# Patient Record
Sex: FEMALE | Race: WHITE | NOT HISPANIC OR LATINO | Employment: STUDENT | URBAN - METROPOLITAN AREA
[De-identification: names, ages, dates, MRNs, and addresses within clinical notes are randomized per-mention and may not be internally consistent; named-entity substitution may affect disease eponyms.]

---
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
## 2023-01-03 DIAGNOSIS — F32.A DEPRESSION, UNSPECIFIED DEPRESSION TYPE: ICD-10-CM

## 2023-02-07 ENCOUNTER — TELEPHONE (OUTPATIENT)
Dept: PSYCHIATRY | Facility: CLINIC | Age: 18
End: 2023-02-07

## 2023-02-07 NOTE — LETTER
23    Pia Luna  : 2005  [address] [place] [address] [address]        Dear Pia Luna and/or Parent/Guardian: We are writing to inform you that your last completed medication management appointment with Rosemary Smiley MD was on 2022  Your health and follow-up care are important to us  We want to make you aware that you do not have another appointment with Rosemary Smiley MD scheduled  Please call our office at [phone] as soon as possible so we can schedule your appointment and prevent an interruption of your care  If you have already scheduled a follow-up appointment, please disregard  If we do not hear from you within 10 business days to make a follow up appointment, we will assume you are no longer interested in care here, as it has been over a year since you were last seen in our office      Sincerely,      [address] [facility] [address]

## 2023-02-10 PROBLEM — R50.9 FEVER: Status: RESOLVED | Noted: 2021-12-27 | Resolved: 2023-02-10

## 2023-02-20 ENCOUNTER — OFFICE VISIT (OUTPATIENT)
Dept: OBGYN CLINIC | Facility: CLINIC | Age: 18
End: 2023-02-20

## 2023-02-20 VITALS
HEART RATE: 90 BPM | BODY MASS INDEX: 26.43 KG/M2 | SYSTOLIC BLOOD PRESSURE: 120 MMHG | DIASTOLIC BLOOD PRESSURE: 76 MMHG | WEIGHT: 140 LBS | HEIGHT: 61 IN

## 2023-02-20 DIAGNOSIS — M22.2X2 PATELLOFEMORAL DISORDER OF LEFT KNEE: ICD-10-CM

## 2023-02-20 DIAGNOSIS — S89.92XA INJURY OF LEFT KNEE, INITIAL ENCOUNTER: ICD-10-CM

## 2023-02-20 DIAGNOSIS — M25.562 ACUTE PAIN OF LEFT KNEE: Primary | ICD-10-CM

## 2023-02-20 NOTE — PROGRESS NOTES
Assessment/Plan:    Diagnoses and all orders for this visit:    Acute pain of left knee  -     XR knee 4+ vw left injury; Future  -     Durable Medical Equipment  -     Ambulatory Referral to Physical Therapy; Future    Injury of left knee, initial encounter  -     XR knee 4+ vw left injury; Future  -     Durable Medical Equipment  -     Ambulatory Referral to Physical Therapy; Future    Patellofemoral disorder of left knee  -     XR knee 4+ vw left injury; Future  -     Durable Medical Equipment  -     Ambulatory Referral to Physical Therapy; Future    Other orders  -     tretinoin (RETIN-A) 0 025 % cream; APPLY EVERY NIGHT AT BEDTIME      Return in about 6 weeks (around 4/3/2023)  Subjective:   Patient ID: Lalit Leblanc is a 21 y o  female  Patient new to me presents for LEFT knee pain occurring about 1 month ago after standing up after squatting, felt immediate pain of the anterior knee no pop with subsequent swelling  This has occurred several times since  She notes difficulty walking and with stairs  Denies symptoms of looseness or instability but will experience popping  Review of Systems    The following portions of the patient's chart were reviewed and updated as appropriate:    Allergy:    Allergies   Allergen Reactions   • Kiwi Extract - Food Allergy Shortness Of Breath   • [place] Flavor - Food Allergy Itching       Medications:    Current Outpatient Medications:   •  albuterol (PROVENTIL HFA,VENTOLIN HFA) 90 mcg/act inhaler, TAKE 2 PUFFS BY MOUTH EVERY 6 HOURS AS NEEDED FOR WHEEZE, Disp: 8 5 Inhaler, Rfl: 4  •  ergocalciferol (VITAMIN D2) 50,000 units, Take 1 capsule (50,000 Units total) by mouth once a week, Disp: 12 capsule, Rfl: 1  •  famotidine (PEPCID) 20 mg tablet, TAKE 1 TABLET BY MOUTH TWICE A DAY FOR 10 DAYS, Disp: 180 tablet, Rfl: 0  •  fluticasone (FLONASE) 50 mcg/act nasal spray, SPRAY 1 SPRAY INTO EACH NOSTRIL EVERY DAY, Disp: 16 mL, Rfl: 1  •  Glycopyrronium Tosylate 2 4 % PADS, Apply topically, Disp: , Rfl:   •  loratadine (CLARITIN) 10 mg tablet, TAKE 1 TABLET BY MOUTH EVERY DAY, Disp: 30 tablet, Rfl: 0  •  medroxyPROGESTERone acetate (DEPO-PROVERA SYRINGE) 150 mg/mL injection, INJECT 150 MG INTRAMUSCULAR EVERY 12 WEEKS, Disp: , Rfl: 0  •  nystatin (MYCOSTATIN) cream, Apply 1 application topically 2 (two) times a day To affected area, Disp: , Rfl:   •  sertraline (ZOLOFT) 50 mg tablet, TAKE 1 TABLET BY MOUTH EVERY DAY, Disp: 90 tablet, Rfl: 0  •  tretinoin (RETIN-A) 0 025 % cream, APPLY EVERY NIGHT AT BEDTIME, Disp: , Rfl:   •  benzonatate (TESSALON) 200 MG capsule, Take 1 capsule (200 mg total) by mouth 3 (three) times a day as needed for cough (Patient not taking: Reported on 2/25/2022), Disp: 20 capsule, Rfl: 0  •  brompheniramine-pseudoephedrine-DM 30-2-10 MG/5ML syrup, , Disp: , Rfl:   •  meloxicam (MOBIC) 15 mg tablet, TAKE 1 TABLET (15 MG TOTAL) BY MOUTH DAILY  (Patient not taking: Reported on 6/14/2022), Disp: 30 tablet, Rfl: 0  •  prednisoLONE (PRELONE) 15 MG/5ML syrup, Take 10 ml daily for 5 days   (Patient not taking: Reported on 7/20/2022), Disp: 50 mL, Rfl: 0    Patient Active Problem List   Diagnosis   • Attention deficit hyperactivity disorder (ADHD), combined type   • REUBEN (generalized anxiety disorder)   • Major depressive disorder, single episode, moderate degree (HCC)   • Vitamin D deficiency   • Exercise counseling   • Nutritional counseling   • Left lower quadrant pain   • Generalized abdominal pain   • Heart murmur   • Excessive and frequent menstruation with irregular cycle   • Dysmenorrhea, unspecified   • Primary insomnia   • Alopecia areata   • Attention deficit hyperactivity disorder, inattentive type   • Depression   • Costochondritis   • Encounter for routine child health examination with abnormal findings   • Encounter for immunization   • Asthma   • Suspected COVID-19 virus infection   • Chest tightness   • Right foot pain   • Hordeolum externum of left upper eyelid   • Weight gain   • Vitamin D insufficiency   • Influenza vaccine administered   • Gastroesophageal reflux disease without esophagitis   • Viral infection, unspecified   • Acute pain of right knee   • Sorethroat   • Epigastric pain   • COVID-19 virus infection   • Other fatigue   • Absolute anemia   • Injury of left knee   • Patellofemoral disorder of left knee   • Acute pain of left knee       Objective:  /76   Pulse 90   Ht 5' 1" (1 549 m)   Wt 63 5 kg (140 lb)   BMI 26 45 kg/m²     Right Knee Exam     Tests   Lachman:  Anterior - negative    Posterior - negative      Left Knee Exam     Tenderness   The patient is experiencing tenderness in the patellar tendon and patella  Range of Motion   The patient has normal left knee ROM  Extension: normal     Tests   Solange:  Medial - negative Lateral - negative  Varus: negative Valgus: negative  Lachman:  Anterior - negative    Posterior - negative    Other   Erythema: absent  Sensation: normal  Swelling: none  Effusion: no effusion present          Observations   Left Knee   Negative for effusion  Physical Exam  Musculoskeletal:      Left knee: No effusion  Instability Tests: Medial Solange test negative and lateral Solange test negative             Neurologic Exam    Procedures    I have personally reviewed pertinent films in PACS and my interpretation is Xrays Left Knee:             Past Medical History:   Diagnosis Date   • Acne    • ADHD    • Anxiety    • Asthma    • Concussion     X 2 IN 2017   • Depression    • Fracture of lumbar spine (HCC)    • GERD (gastroesophageal reflux disease)    • Hydronephrosis    • Pertussis    • Stress fracture    • Vesicoureteral reflux        Past Surgical History:   Procedure Laterality Date   • CYST REMOVAL      Left forearm   • WISDOM TOOTH EXTRACTION         Social History     Socioeconomic History   • Marital status: Single     Spouse name: Not on file   • Number of children: Not on file   • Years of education: Not on file   • Highest education level: Not on file   Occupational History   • Occupation: STUDENT   Tobacco Use   • Smoking status: Never   • Smokeless tobacco: Former   Vaping Use   • Vaping Use: Former   Substance and Sexual Activity   • Alcohol use: Never   • Drug use: Never   • Sexual activity: Not on file   Other Topics Concern   • Not on file   Social History Narrative   • Not on file     Social Determinants of Health     Financial Resource Strain: Not on file   Food Insecurity: Not on file   Transportation Needs: Not on file   Physical Activity: Not on file   Stress: Not on file   Social Connections: Not on file   Intimate Partner Violence: Not on file   Housing Stability: Not on file       Family History   Problem Relation Age of Onset   • Hypertension Mother    • Heart murmur Mother    • Depression Mother    • Anxiety disorder Mother    • Celiac disease Father    • ADD / ADHD Father    • Cancer Paternal Grandmother         adrenal gland   • Celiac disease Paternal Grandfather    • Anxiety disorder Sister    • Depression Sister    • ADD / ADHD Sister

## 2023-03-14 ENCOUNTER — TELEPHONE (OUTPATIENT)
Dept: OBGYN CLINIC | Facility: HOSPITAL | Age: 18
End: 2023-03-14

## 2023-03-14 NOTE — TELEPHONE ENCOUNTER
Caller: Patient    Doctor: Lauren Mccarthy    Reason for call: Patient calling back looking for an update in regards to her knee pain      Call back#: [phone]

## 2023-03-14 NOTE — TELEPHONE ENCOUNTER
Caller: Darryl Aguirre     Doctor: Alisia Grief    Reason for call: Patient called she is having a lot of pain in her left knee and it is swollen  She said it feels like it is going to give out  Please advise       Call back#: [phone]

## 2023-03-15 NOTE — TELEPHONE ENCOUNTER
Spoke to the patient and she states her pain is getting worse and knee feels unstable  She is doing HEP for 3 weeks  Appt scheduled for 3/17

## 2023-03-17 ENCOUNTER — OFFICE VISIT (OUTPATIENT)
Dept: OBGYN CLINIC | Facility: CLINIC | Age: 18
End: 2023-03-17

## 2023-03-17 VITALS
DIASTOLIC BLOOD PRESSURE: 76 MMHG | HEART RATE: 80 BPM | WEIGHT: 140 LBS | BODY MASS INDEX: 26.43 KG/M2 | SYSTOLIC BLOOD PRESSURE: 122 MMHG | HEIGHT: 61 IN

## 2023-03-17 DIAGNOSIS — S89.92XD INJURY OF LEFT KNEE, SUBSEQUENT ENCOUNTER: ICD-10-CM

## 2023-03-17 DIAGNOSIS — M22.2X2 PATELLOFEMORAL DISORDER OF LEFT KNEE: ICD-10-CM

## 2023-03-17 DIAGNOSIS — M25.562 ACUTE PAIN OF LEFT KNEE: Primary | ICD-10-CM

## 2023-03-17 NOTE — PATIENT INSTRUCTIONS
You may use Advil (ibuprofen) 400-600mg every 6 hours or at least twice per day OR Aleve (naproxen) 250-500mg every 12 hours as needed for pain and inflammation  You may also take Tylenol 500mg every 4-6 hours as needed OR max 1,000mg per dose up to 3 times per day for a total of 3,000mg per day  Check with your primary care physician to see if these medications are safe to take and to make sure they do not interfere with your other medications and medical issues  Patellofemoral Pain Syndrome   WHAT YOU NEED TO KNOW:   What is patellofemoral pain syndrome (PFPS)? PFPS is pain in or around your patella (kneecap)  PFPS is also called runner's knee or jumper's knee and is common in athletes  PFPS can develop when the patella rubs against the femur (thigh bone) as you move your knee  It may also happen when the patella moves out of place  PFPS may be caused by chondromalacia  This is a softening and breakdown of the cartilage under your kneecap  The kneecap is a natural shock absorber  Trauma, previous injury to your knee, excessive use, or activities such as running can cause your symptoms  What increases my risk for PFPS? Increased activity, or overactivity    A hip, knee, and ankle that are not lined up correctly    Leg muscles that are not balanced or are weak    Fallen or high arches    Obesity    What are the signs and symptoms of PFPS? Popping or crackling sounds when you move your knee    Pain when you go up or down the stairs, squat, run, or ride a bike    Pain after you sit for a long time with your knees bent    Swelling, stiffness, or weakness in your knee    How is PFPS diagnosed? Your healthcare provider will examine your knee and ask about your symptoms  He or she may move your legs in different directions to check for pain, and to see how your kneecap moves  You may also need the following: An x-ray, CT, or MRI  may show damage to your knee and the tissues that surround it   You may be given contrast liquid to help your knee show up better in the pictures  Tell the healthcare provider if you have ever had an allergic reaction to contrast liquid  Do not enter the MRI room with anything metal  Metal can cause serious injury  Tell the healthcare provider if you have any metal in or on your body  Arthroscopy  may be used to look inside your knee  Healthcare providers use a scope (thin, bendable tube with a light and camera on the end) to examine your knee  How can I manage my symptoms? Change your activity  You may need to rest your knee  You may need to change your exercise routine to low-impact activities  Apply ice  on your knee for 15 to 20 minutes every hour or as directed  Use an ice pack, or put crushed ice in a plastic bag  Cover it with a towel before you apply it  Ice helps prevent tissue damage and decreases swelling and pain  Elevate  your knee above the level of your heart as often as you can  This will help decrease swelling and pain  Prop your leg on pillows or blankets to keep it elevated comfortably  Do not  put a pillow directly under your knee  Support  your knee by wrapping it with tape or an elastic bandage  You may need a brace for more support  This will help decrease swelling and keep your kneecap in the correct spot  Wear shoe inserts as directed  Orthotics or arch supports help keep your foot and ankle stable and in line to decrease stress on your knee  Go to physical therapy as directed  A physical therapist will teach you exercises to help improve movement and strength, and to decrease pain  Maintain a healthy weight  Ask your healthcare provider what a healthy weight is for you  Ask him or her to help you create a weight loss plan if you are overweight  Weight loss can help decrease pressure on your knee  How is PFPS treated? Acetaminophen  decreases pain and fever  It is available without a doctor's order   Ask how much to take and how often to take it  Follow directions  Read the labels of all other medicines you are using to see if they also contain acetaminophen, or ask your doctor or pharmacist  Acetaminophen can cause liver damage if not taken correctly  NSAIDs , such as ibuprofen, help decrease swelling, pain, and fever  This medicine is available with or without a doctor's order  NSAIDs can cause stomach bleeding or kidney problems in certain people  If you take blood thinner medicine, always ask your healthcare provider if NSAIDs are safe for you  Always read the medicine label and follow directions  Surgery  may be needed if other treatments do not work  During surgery, the back of your kneecap is smoothed  A ligament may also be cut to allow the knee to return to its normal position  Surgery may be done through small incisions as during arthroscopy, or you may need a larger incision  A cut in your shin bone may also be needed to help line up your kneecap correctly  How can I prevent another episode? Wear the right shoes for your activities  Increase activity gradually  Warm up before you exercise  Stretch your leg muscles before and after activity  When should I call my doctor? You have trouble walking  You have a fever  Your knee brace or sleeve is too tight  Your symptoms are not getting better, or they get worse  Your pain and swelling increase even after you take pain medicine  You have questions or concerns about your condition or care  CARE AGREEMENT:   You have the right to help plan your care  Learn about your health condition and how it may be treated  Discuss treatment options with your healthcare providers to decide what care you want to receive  You always have the right to refuse treatment  The above information is an  only  It is not intended as medical advice for individual conditions or treatments   Talk to your doctor, nurse or pharmacist before following any medical regimen to see if it is safe and effective for you  © Copyright [employer] 2022 Information is for End User's use only and may not be sold, redistributed or otherwise used for commercial purposes

## 2023-03-17 NOTE — PROGRESS NOTES
Assessment/Plan:    Diagnoses and all orders for this visit:    Acute pain of left knee    Injury of left knee, subsequent encounter    Patellofemoral disorder of left knee    Discussed important of PT  Prior MRI 2022 for similar symptoms WNL, recent Xray WNL  If no improvement t/c referral to Orthopedic surgeon    Return in about 6 weeks (around 4/28/2023)  Subjective:   Patient ID: Leonardo Cobb is a 21 y o  female  Patient returns with continued pain and swelling of the left knee  Pain is anterior, feels unstable, had to miss work yesterday  She has been wearing patellar stabilizing brace  She had performed formal PT in 2022 for similar issues (hx MRI) and does home exercises since every other day  Symptoms worse with activity  She notes she fell down stairs 12/2022 but doesn't note increased pain after that  She noted tingling of the thigh down to the ankle    Initial note:   Patient new to me presents for LEFT knee pain occurring about 1 month ago after standing up after squatting, felt immediate pain of the anterior knee no pop with subsequent swelling  This has occurred several times since  She notes difficulty walking and with stairs  Denies symptoms of looseness or instability but will experience popping  Patient works at [address] [place] [address]    The following portions of the patient's chart were reviewed and updated as appropriate:    Allergy:    Allergies   Allergen Reactions   • Kiwi Extract - Food Allergy Shortness Of Breath   • [place] Flavor - Food Allergy Itching       Medications:    Current Outpatient Medications:   •  albuterol (PROVENTIL HFA,VENTOLIN HFA) 90 mcg/act inhaler, TAKE 2 PUFFS BY MOUTH EVERY 6 HOURS AS NEEDED FOR WHEEZE, Disp: 8 5 Inhaler, Rfl: 4  •  ergocalciferol (VITAMIN D2) 50,000 units, Take 1 capsule (50,000 Units total) by mouth once a week, Disp: 12 capsule, Rfl: 1  •  famotidine (PEPCID) 20 mg tablet, TAKE 1 TABLET BY MOUTH TWICE A DAY FOR 10 DAYS, Disp: 180 tablet, Rfl: 0  •  fluticasone (FLONASE) 50 mcg/act nasal spray, SPRAY 1 SPRAY INTO EACH NOSTRIL EVERY DAY, Disp: 16 mL, Rfl: 1  •  Glycopyrronium Tosylate 2 4 % PADS, Apply topically, Disp: , Rfl:   •  loratadine (CLARITIN) 10 mg tablet, TAKE 1 TABLET BY MOUTH EVERY DAY, Disp: 30 tablet, Rfl: 0  •  medroxyPROGESTERone acetate (DEPO-PROVERA SYRINGE) 150 mg/mL injection, INJECT 150 MG INTRAMUSCULAR EVERY 12 WEEKS, Disp: , Rfl: 0  •  sertraline (ZOLOFT) 50 mg tablet, TAKE 1 TABLET BY MOUTH EVERY DAY, Disp: 90 tablet, Rfl: 0  •  tretinoin (RETIN-A) 0 025 % cream, APPLY EVERY NIGHT AT BEDTIME, Disp: , Rfl:   •  benzonatate (TESSALON) 200 MG capsule, Take 1 capsule (200 mg total) by mouth 3 (three) times a day as needed for cough (Patient not taking: Reported on 2/25/2022), Disp: 20 capsule, Rfl: 0  •  brompheniramine-pseudoephedrine-DM 30-2-10 MG/5ML syrup, , Disp: , Rfl:   •  meloxicam (MOBIC) 15 mg tablet, TAKE 1 TABLET (15 MG TOTAL) BY MOUTH DAILY  (Patient not taking: Reported on 6/14/2022), Disp: 30 tablet, Rfl: 0  •  nystatin (MYCOSTATIN) cream, Apply 1 application topically 2 (two) times a day To affected area, Disp: , Rfl:   •  prednisoLONE (PRELONE) 15 MG/5ML syrup, Take 10 ml daily for 5 days   (Patient not taking: Reported on 7/20/2022), Disp: 50 mL, Rfl: 0    Patient Active Problem List   Diagnosis   • Attention deficit hyperactivity disorder (ADHD), combined type   • REUBEN (generalized anxiety disorder)   • Major depressive disorder, single episode, moderate degree (HCC)   • Vitamin D deficiency   • Exercise counseling   • Nutritional counseling   • Left lower quadrant pain   • Generalized abdominal pain   • Heart murmur   • Excessive and frequent menstruation with irregular cycle   • Dysmenorrhea, unspecified   • Primary insomnia   • Alopecia areata   • Attention deficit hyperactivity disorder, inattentive type   • Depression   • Costochondritis   • Encounter for routine child health examination with abnormal findings   • Encounter for immunization   • Asthma   • Suspected COVID-19 virus infection   • Chest tightness   • Right foot pain   • Hordeolum externum of left upper eyelid   • Weight gain   • Vitamin D insufficiency   • Influenza vaccine administered   • Gastroesophageal reflux disease without esophagitis   • Viral infection, unspecified   • Acute pain of right knee   • Sorethroat   • Epigastric pain   • COVID-19 virus infection   • Other fatigue   • Absolute anemia   • Injury of left knee   • Patellofemoral disorder of left knee   • Acute pain of left knee       Objective:  /76   Pulse 80   Ht 5' 1" (1 549 m)   Wt 63 5 kg (140 lb)   BMI 26 45 kg/m²     Ortho Exam    Physical Exam      Neurologic Exam    Procedures    I have personally reviewed the written report of the pertinent studies     2023 Xrays Left knee WNL  2022 MRI Left knee WNL            Past Medical History:   Diagnosis Date   • Acne    • ADHD    • Anxiety    • Asthma    • Concussion     X 2 IN 2017   • Depression    • Fracture of lumbar spine (HCC)    • GERD (gastroesophageal reflux disease)    • Hydronephrosis    • Pertussis    • Stress fracture    • Vesicoureteral reflux        Past Surgical History:   Procedure Laterality Date   • CYST REMOVAL      Left forearm   • WISDOM TOOTH EXTRACTION         Social History     Socioeconomic History   • Marital status: Single     Spouse name: Not on file   • Number of children: Not on file   • Years of education: Not on file   • Highest education level: Not on file   Occupational History   • Occupation: STUDENT   Tobacco Use   • Smoking status: Never   • Smokeless tobacco: Former   Vaping Use   • Vaping Use: Former   Substance and Sexual Activity   • Alcohol use: Never   • Drug use: Never   • Sexual activity: Not on file   Other Topics Concern   • Not on file   Social History Narrative   • Not on file     Social Determinants of Health     Financial Resource Strain: Not on file   Food Insecurity: Not on file   Transportation Needs: Not on file   Physical Activity: Not on file   Stress: Not on file   Social Connections: Not on file   Intimate Partner Violence: Not on file   Housing Stability: Not on file       Family History   Problem Relation Age of Onset   • Hypertension Mother    • Heart murmur Mother    • Depression Mother    • Anxiety disorder Mother    • Celiac disease Father    • ADD / ADHD Father    • Cancer Paternal Grandmother         adrenal gland   • Celiac disease Paternal Grandfather    • Anxiety disorder Sister    • Depression Sister    • ADD / ADHD Sister

## 2023-03-20 ENCOUNTER — TELEPHONE (OUTPATIENT)
Dept: PSYCHIATRY | Facility: CLINIC | Age: 18
End: 2023-03-20

## 2023-03-20 NOTE — TELEPHONE ENCOUNTER
Patient called and left voice mail to schedule as a new patient  Called and left patient message to return call  Saw Dr Ever Melchor L/S 4/5/22

## 2023-03-31 ENCOUNTER — EVALUATION (OUTPATIENT)
Dept: PHYSICAL THERAPY | Age: 18
End: 2023-03-31

## 2023-03-31 DIAGNOSIS — M22.2X2 PATELLOFEMORAL DISORDER OF LEFT KNEE: ICD-10-CM

## 2023-03-31 DIAGNOSIS — M22.2X2 PATELLOFEMORAL PAIN SYNDROME OF LEFT KNEE: Primary | ICD-10-CM

## 2023-03-31 DIAGNOSIS — M25.562 ACUTE PAIN OF LEFT KNEE: ICD-10-CM

## 2023-03-31 DIAGNOSIS — S89.92XA INJURY OF LEFT KNEE, INITIAL ENCOUNTER: ICD-10-CM

## 2023-03-31 NOTE — PROGRESS NOTES
PT Evaluation     Today's date: 3/31/2023  Patient name: Damian Eral  : 2005  MRN: [id number]  Referring provider: Priyank Washington MD  Dx:   Encounter Diagnosis     ICD-10-CM    1  Patellofemoral pain syndrome of left knee  M22 2X2       2  Acute pain of left knee  M25 562 Ambulatory Referral to Physical Therapy      3  Injury of left knee, initial encounter  S89  92XA Ambulatory Referral to Physical Therapy      4  Patellofemoral disorder of left knee  M22 2X2 Ambulatory Referral to Physical Therapy                     Assessment  Assessment details: Damian Earl is a 21 y o  female who presents with complaints of L knee  No referral is necessary at this time  Patient has a movement impairment diagnosis of hypermobility of L patella and painful movements representing a pathoanatomical diagnosis of PFPS  Patient is experiencing pain with movement, tightness of LLE, and weakness of LLE that is limiting her ability to perform work duties at [employer]  Patient has a positive prognosis  Patient would benefit from PT to address these impairments leading to increased functional capacity and improved quality of life  Patient made aware of condition as well as the proposed treatment plan, including risks, benefits and alternatives  Impairments: impaired physical strength, lacks appropriate home exercise program and pain with function    Symptom irritability: lowUnderstanding of Dx/Px/POC: excellent  Goals  Short Term Goals: to be achieved by 4 weeks  1) Patient to be independent with basic HEP  2) Decrease pain to 4/10 at its worst   3) Increase LE strength by 1/2 MMT grade in all deficient planes  Long Term Goals: to be achieved by discharge  1) FOTO equal to or greater than 63   2) Patient to be independent with comprehensive HEP  3) Increase LE strength to 5/5 MMT grade in all planes to improve a/iadls  4) Increase ambulatory tolerance to >60 min without pain    5) Increase standing tolerance to >60 min without pain  6) Increase lifting tolerance to 25-30# without pain  Plan  Plan details: Address physical impairments found during IE via therapeutic exercises, neuromuscular re-education, and manual therapy to improve functional tolerance  Develop HEP for self-management of symptoms  Patient would benefit from: skilled physical therapy  Referral necessary: No  Planned therapy interventions: home exercise program, joint mobilization, manual therapy, massage, neuromuscular re-education, patient education, strengthening, stretching, therapeutic activities, therapeutic exercise, therapeutic training and transfer training  Frequency: 2x week  Duration in visits: 16  Duration in weeks: 8  Plan of Care beginning date: 3/31/2023  Plan of Care expiration date: 2023  Treatment plan discussed with: patient        Subjective Evaluation    History of Present Illness  Date of onset: 2023  Mechanism of injury: Patient reports February pain in L knee started again, originally started last year with no distinct GALINA  States she fell down the stairs in December but didn't have pain until February so unsure if related  States pain with walking, standing, stairs  Pain hasn't stopped her from doing anything  States pain is throbbing and sometimes stabbing, when walking for a while  Able to stand ~20 min, walking ~15 min  Currently working at a , 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, lifting max 25-30 lbs  Most painful after doing a lot of activity  Has started doing exercises from previous therapy which has helped some, including lunges, rolling, and bands  States she needs to complete formal therapy before insurance will approve for MRI to determine if surgery is something that will be helpful             Recurrent probem    Quality of life: excellent    Pain  Current pain ratin  At best pain ratin  At worst pain ratin  Location: posterior to knee cap  Quality: throbbing and knife-like  Relieving factors: ice, relaxation and rest  Aggravating factors: standing, walking, stair climbing and lifting  Progression: no change      Diagnostic Tests  X-ray: normal  MRI studies: normal  Treatments  No previous or current treatments  Patient Goals  Patient goals for therapy: decreased pain and increased strength          Objective     General Comments:      Knee Comments  Posture: WNL  Palpation: TTP medial joint line and patellar tendon L knee  Myotomes (L/R):  Intact b/l  Dermatome: (pinprick- L/R): NA      Reflexes:  (L/R) L3-4:  1+/1+      S1:   2+/2+             GAIT: no antalgia noted, WNL  Squat assess: performs without pain, anterior translation of b/l knees  Lunge: performs without pain  Quad set: good b/l    Ligamentous Testing:   Lachman's Test: negative   Anterior Drawer: negative   Varus/Valgus (Cruciate bias): negative   Varus/Valgus (Collateral bias): negative   Posterior Drawer: negative     Meniscal Tests:  Catching/Clicking/Locking no  Joint Line Tenderness: yes  Pain with knee flexion overpressure: no  Pain with knee extension overpressure: no            MMT         AROM         PROM   Hip       L     R         L       R          L         R  Flex  4+ 5      Extn  4- 5      Abd  4- 4+                    Knee        Ext 4! 5     Flex 4! 5                   Special Test:  90/90 Test:  (+) b/l, increased tightness L compared to R     Agustín's sign: (+) L              Segmental mobility:   Patella:  Min hypermobility lateral glide L patella, feeling of instability, pain below patella              Comments: tightness and weakness of LE musculature, pain with knee movements although ROM is WNL                 Precautions: depression, ADHD      Manuals 3/31            L knee mob with movement             PROM L knee                                       Neuro Re-Ed             glute bridge HEP            clamshell HEP            LAQ HEP  w weight nv            SHC             SLR             Lunge Banded lateral/monster walks             Eccentric step down - forward/lateral             Ther Ex             Seated HS stretch HEP            Standing gastroc stretch HEP            RB - knee mobility, activity tolerance             Pt education CS                                                                Ther Activity                                       Gait Training                                       Modalities

## 2023-04-07 ENCOUNTER — OFFICE VISIT (OUTPATIENT)
Dept: PHYSICAL THERAPY | Age: 18
End: 2023-04-07

## 2023-04-07 DIAGNOSIS — M22.2X2 PATELLOFEMORAL PAIN SYNDROME OF LEFT KNEE: Primary | ICD-10-CM

## 2023-04-07 DIAGNOSIS — M25.562 ACUTE PAIN OF LEFT KNEE: ICD-10-CM

## 2023-04-07 DIAGNOSIS — M22.2X2 PATELLOFEMORAL DISORDER OF LEFT KNEE: ICD-10-CM

## 2023-04-07 DIAGNOSIS — S89.92XA INJURY OF LEFT KNEE, INITIAL ENCOUNTER: ICD-10-CM

## 2023-04-07 NOTE — PROGRESS NOTES
"Daily Note     Today's date: 2023  Patient name: Nixon Maldonado  : 2005  MRN: [id number]  Referring provider: Yvette Caceres DO  Dx:   Encounter Diagnosis     ICD-10-CM    1  Patellofemoral pain syndrome of left knee  M22 2X2       2  Acute pain of left knee  M25 562       3  Injury of left knee, initial encounter  S89  92XA       4  Patellofemoral disorder of left knee  M22 2X2                      Subjective: Patient denies pain in knees, exercises going well at home  Had some pain day of eval and day after but resolved since  Objective: See treatment diary below      Assessment: Tolerated treatment well  Progression of LE strengthening without complaints of pain throughout session, leg ext and [address]  Updated HEP and reviewed exercises with patient demonstrating proper form  Patient would benefit from continued PT to further strengthen LEs  Plan: Continue per plan of care  Progress treatment as tolerated  Continue to update HEP, dropping visits to once/week due to patient work schedule        Precautions: depression, ADHD      Manuals 3/31 4/7           L knee mob with movement             PROM L knee             L HS stretch  CS           Patella mobs  CS           Neuro Re-Ed             glute bridge HEP 3x10           clamshell HEP 3x10           LAQ HEP  w weight nv 2# 2x10x2\" ecc ea           SHC  2x10 ea           SLR  2x10           Minisquat  3x10           Lunge             Leg press             Banded lateral/monster walks             Eccentric step down - forward/lateral             Ther Ex             Seated HS stretch HEP            Standing gastroc stretch HEP            RB - knee mobility, activity tolerance  Upright 10'           Pt education CS                                                                Ther Activity                                       Gait Training                                       Modalities                                            "

## 2023-04-12 ENCOUNTER — APPOINTMENT (OUTPATIENT)
Dept: PHYSICAL THERAPY | Age: 18
End: 2023-04-12

## 2023-04-19 ENCOUNTER — APPOINTMENT (OUTPATIENT)
Dept: PHYSICAL THERAPY | Age: 18
End: 2023-04-19

## 2023-04-21 ENCOUNTER — APPOINTMENT (OUTPATIENT)
Dept: PHYSICAL THERAPY | Age: 18
End: 2023-04-21

## 2023-04-26 ENCOUNTER — APPOINTMENT (OUTPATIENT)
Dept: PHYSICAL THERAPY | Age: 18
End: 2023-04-26

## 2023-04-28 ENCOUNTER — OFFICE VISIT (OUTPATIENT)
Dept: PHYSICAL THERAPY | Age: 18
End: 2023-04-28

## 2023-04-28 ENCOUNTER — OFFICE VISIT (OUTPATIENT)
Dept: OBGYN CLINIC | Facility: CLINIC | Age: 18
End: 2023-04-28

## 2023-04-28 VITALS — BODY MASS INDEX: 26.43 KG/M2 | WEIGHT: 140 LBS | HEIGHT: 61 IN

## 2023-04-28 DIAGNOSIS — M22.2X2 PATELLOFEMORAL DISORDER OF LEFT KNEE: ICD-10-CM

## 2023-04-28 DIAGNOSIS — S89.92XA INJURY OF LEFT KNEE, INITIAL ENCOUNTER: ICD-10-CM

## 2023-04-28 DIAGNOSIS — M25.562 ACUTE PAIN OF LEFT KNEE: ICD-10-CM

## 2023-04-28 DIAGNOSIS — G89.29 CHRONIC PAIN OF LEFT KNEE: Primary | ICD-10-CM

## 2023-04-28 DIAGNOSIS — S89.92XD INJURY OF LEFT KNEE, SUBSEQUENT ENCOUNTER: ICD-10-CM

## 2023-04-28 DIAGNOSIS — M22.2X2 PATELLOFEMORAL PAIN SYNDROME OF LEFT KNEE: Primary | ICD-10-CM

## 2023-04-28 DIAGNOSIS — M25.562 CHRONIC PAIN OF LEFT KNEE: Primary | ICD-10-CM

## 2023-04-28 NOTE — PROGRESS NOTES
Assessment/Plan:    Diagnoses and all orders for this visit:    Chronic pain of left knee    Injury of left knee, subsequent encounter    Patellofemoral disorder of left knee    Continue home exercise program    Return if symptoms worsen or fail to improve  Subjective:   Patient ID: Oskar Powers is a 21 y o  female  Clementeen Hammock returns with resolution of pain of the left knee she has been participating in formal physical therapy  Review of Systems    The following portions of the patient's chart were reviewed and updated as appropriate:    Allergy:    Allergies   Allergen Reactions   • Kiwi Extract - Food Allergy Shortness Of Breath   • [place] Flavor - Food Allergy Itching       Medications:    Current Outpatient Medications:   •  albuterol (PROVENTIL HFA,VENTOLIN HFA) 90 mcg/act inhaler, TAKE 2 PUFFS BY MOUTH EVERY 6 HOURS AS NEEDED FOR WHEEZE, Disp: 8 5 Inhaler, Rfl: 4  •  ergocalciferol (VITAMIN D2) 50,000 units, Take 1 capsule (50,000 Units total) by mouth once a week, Disp: 12 capsule, Rfl: 1  •  famotidine (PEPCID) 20 mg tablet, TAKE 1 TABLET BY MOUTH TWICE A DAY FOR 10 DAYS, Disp: 180 tablet, Rfl: 0  •  fluticasone (FLONASE) 50 mcg/act nasal spray, SPRAY 1 SPRAY INTO EACH NOSTRIL EVERY DAY, Disp: 16 mL, Rfl: 1  •  Glycopyrronium Tosylate 2 4 % PADS, Apply topically, Disp: , Rfl:   •  loratadine (CLARITIN) 10 mg tablet, TAKE 1 TABLET BY MOUTH EVERY DAY, Disp: 30 tablet, Rfl: 0  •  medroxyPROGESTERone acetate (DEPO-PROVERA SYRINGE) 150 mg/mL injection, INJECT 150 MG INTRAMUSCULAR EVERY 12 WEEKS, Disp: , Rfl: 0  •  nystatin (MYCOSTATIN) cream, Apply 1 application topically 2 (two) times a day To affected area, Disp: , Rfl:   •  sertraline (ZOLOFT) 50 mg tablet, TAKE 1 TABLET BY MOUTH EVERY DAY, Disp: 90 tablet, Rfl: 0  •  tretinoin (RETIN-A) 0 025 % cream, APPLY EVERY NIGHT AT BEDTIME, Disp: , Rfl:   •  benzonatate (TESSALON) 200 MG capsule, Take 1 capsule (200 mg total) by mouth 3 (three) times a "day as needed for cough (Patient not taking: Reported on 2/25/2022), Disp: 20 capsule, Rfl: 0  •  brompheniramine-pseudoephedrine-DM 30-2-10 MG/5ML syrup, , Disp: , Rfl:   •  meloxicam (MOBIC) 15 mg tablet, TAKE 1 TABLET (15 MG TOTAL) BY MOUTH DAILY  (Patient not taking: Reported on 6/14/2022), Disp: 30 tablet, Rfl: 0  •  prednisoLONE (PRELONE) 15 MG/5ML syrup, Take 10 ml daily for 5 days  (Patient not taking: Reported on 7/20/2022), Disp: 50 mL, Rfl: 0    Patient Active Problem List   Diagnosis   • Attention deficit hyperactivity disorder (ADHD), combined type   • REUBEN (generalized anxiety disorder)   • Major depressive disorder, single episode, moderate degree (HCC)   • Vitamin D deficiency   • Exercise counseling   • Nutritional counseling   • Left lower quadrant pain   • Generalized abdominal pain   • Heart murmur   • Excessive and frequent menstruation with irregular cycle   • Dysmenorrhea, unspecified   • Primary insomnia   • Alopecia areata   • Attention deficit hyperactivity disorder, inattentive type   • Depression   • Costochondritis   • Encounter for routine child health examination with abnormal findings   • Encounter for immunization   • Asthma   • Suspected COVID-19 virus infection   • Chest tightness   • Right foot pain   • Hordeolum externum of left upper eyelid   • Weight gain   • Vitamin D insufficiency   • Influenza vaccine administered   • Gastroesophageal reflux disease without esophagitis   • Viral infection, unspecified   • Acute pain of right knee   • Sorethroat   • Epigastric pain   • COVID-19 virus infection   • Other fatigue   • Absolute anemia   • Injury of left knee   • Patellofemoral disorder of left knee   • Acute pain of left knee       Objective:  Ht 5' 1\" (1 549 m)   Wt 63 5 kg (140 lb)   BMI 26 45 kg/m²     Ortho Exam    Physical Exam      Neurologic Exam    Procedures    I have personally reviewed the written report of the pertinent studies               Past Medical History: " Diagnosis Date   • Acne    • ADHD    • Anxiety    • Asthma    • Concussion     X 2 IN 2017   • Depression    • Fracture of lumbar spine (HCC)    • GERD (gastroesophageal reflux disease)    • Hydronephrosis    • Pertussis    • Stress fracture    • Vesicoureteral reflux        Past Surgical History:   Procedure Laterality Date   • CYST REMOVAL      Left forearm   • WISDOM TOOTH EXTRACTION         Social History     Socioeconomic History   • Marital status: Single     Spouse name: Not on file   • Number of children: Not on file   • Years of education: Not on file   • Highest education level: Not on file   Occupational History   • Occupation: STUDENT   Tobacco Use   • Smoking status: Never   • Smokeless tobacco: Former   Vaping Use   • Vaping Use: Former   Substance and Sexual Activity   • Alcohol use: Never   • Drug use: Never   • Sexual activity: Not on file   Other Topics Concern   • Not on file   Social History Narrative   • Not on file     Social Determinants of Health     Financial Resource Strain: Not on file   Food Insecurity: Not on file   Transportation Needs: Not on file   Physical Activity: Not on file   Stress: Not on file   Social Connections: Not on file   Intimate Partner Violence: Not on file   Housing Stability: Not on file       Family History   Problem Relation Age of Onset   • Hypertension Mother    • Heart murmur Mother    • Depression Mother    • Anxiety disorder Mother    • Celiac disease Father    • ADD / ADHD Father    • Cancer Paternal Grandmother         adrenal gland   • Celiac disease Paternal Grandfather    • Anxiety disorder Sister    • Depression Sister    • ADD / ADHD Sister

## 2023-04-28 NOTE — PROGRESS NOTES
Discharge Summary     Today's date: 2023  Patient name: Danny Alvarez  : 2005  MRN: [id number]  Referring provider: Hermes Duran DO  Dx:   Encounter Diagnosis     ICD-10-CM    1  Patellofemoral pain syndrome of left knee  M22 2X2       2  Acute pain of left knee  M25 562       3  Injury of left knee, initial encounter  S89  92XA       4  Patellofemoral disorder of left knee  M22 2X2                      Subjective: Patient reports 90% improvement since beginning therapy  Has noticed improvements in ability to squat at work and get up without pain, going up/down stairs, and holding nephew without pain  Denies difficulty with any activity, just getting tired with walking for longer periods of time  Current pain =0  Best pain=0  Worst pain =0        Objective: See treatment diary below      Assessment: Patient demonstrates functional improvements evidenced by meeting all short and long-term goals  Patient has improved in LE strength, pain ratings, and functional tolerance in accordance with improved FOTO scores with no current limitations in functional ability  At this time, patient is appropriate and agreeable to discharge with HEP  Will call with any questions/concerns moving forward  Goals  Short Term Goals: to be achieved by 4 weeks  1) Patient to be independent with basic HEP  Met  2) Decrease pain to 4/10 at its worst  Met  3) Increase LE strength by 1/2 MMT grade in all deficient planes  Met    Long Term Goals: to be achieved by discharge  1) FOTO equal to or greater than 63  Met  2) Patient to be independent with comprehensive HEP  Met  3) Increase LE strength to 5/5 MMT grade in all planes to improve a/iadls  Met  4) Increase ambulatory tolerance to >60 min without pain  Met  5) Increase standing tolerance to >60 min without pain  Met  6) Increase lifting tolerance to 25-30# without pain  Met      Plan: Discharge with HEP        Precautions: depression, ADHD      Manuals 3/31 4/7 4/14 4/28 "      L knee mob with movement             PROM L knee             L HS stretch  CS CS          Patella mobs  CS CS          Re-assess    CS         Neuro Re-Ed             glute bridge HEP 3x10 3x10x3\" 2x10x3\"         clamshell HEP 3x10           LAQ HEP  w weight nv 2# 2x10x2\" ecc ea 3# 2x10x2\" ecc ea 4# 2x10x2\" ecc ea         SHC  2x10 ea           SLR  2x10  reviewed         Minisquat  3x10 3x10          Lunge   2x10 alt          Leg press   105# 3x10  115# 3x10         Banded lateral/monster walks   2 laps ea rtb          Eccentric step down - forward/lateral   2x10 ea 6\"          Ther Ex             Seated HS stretch HEP            Standing gastroc stretch HEP            RB - knee mobility, activity tolerance  Upright 10' upright 10' upright 10'         Pt education CS   CS                                                             Ther Activity                                       Gait Training                                       Modalities                                            "

## 2023-06-01 DIAGNOSIS — E55.9 VITAMIN D INSUFFICIENCY: ICD-10-CM

## 2023-06-01 RX ORDER — ERGOCALCIFEROL 1.25 MG/1
CAPSULE ORAL
Qty: 12 CAPSULE | Refills: 1 | Status: SHIPPED | OUTPATIENT
Start: 2023-06-01 | End: 2023-06-11 | Stop reason: ALTCHOICE

## 2023-06-09 ENCOUNTER — OFFICE VISIT (OUTPATIENT)
Dept: FAMILY MEDICINE CLINIC | Facility: CLINIC | Age: 18
End: 2023-06-09
Payer: COMMERCIAL

## 2023-06-09 VITALS
HEIGHT: 61 IN | BODY MASS INDEX: 27.38 KG/M2 | TEMPERATURE: 98.3 F | HEART RATE: 97 BPM | SYSTOLIC BLOOD PRESSURE: 126 MMHG | DIASTOLIC BLOOD PRESSURE: 80 MMHG | OXYGEN SATURATION: 95 % | RESPIRATION RATE: 16 BRPM | WEIGHT: 145 LBS

## 2023-06-09 DIAGNOSIS — G89.29 CHRONIC RIGHT SHOULDER PAIN: Primary | ICD-10-CM

## 2023-06-09 DIAGNOSIS — Z00.00 HEALTHCARE MAINTENANCE: ICD-10-CM

## 2023-06-09 DIAGNOSIS — M25.511 CHRONIC RIGHT SHOULDER PAIN: Primary | ICD-10-CM

## 2023-06-09 DIAGNOSIS — F32.89 OTHER DEPRESSION: ICD-10-CM

## 2023-06-09 DIAGNOSIS — J45.909 ASTHMA, UNSPECIFIED ASTHMA SEVERITY, UNSPECIFIED WHETHER COMPLICATED, UNSPECIFIED WHETHER PERSISTENT: ICD-10-CM

## 2023-06-09 DIAGNOSIS — K21.9 GASTROESOPHAGEAL REFLUX DISEASE WITHOUT ESOPHAGITIS: ICD-10-CM

## 2023-06-09 DIAGNOSIS — E55.9 VITAMIN D DEFICIENCY: ICD-10-CM

## 2023-06-09 DIAGNOSIS — D50.8 OTHER IRON DEFICIENCY ANEMIA: ICD-10-CM

## 2023-06-09 PROCEDURE — 99395 PREV VISIT EST AGE 18-39: CPT | Performed by: FAMILY MEDICINE

## 2023-06-09 PROCEDURE — 99214 OFFICE O/P EST MOD 30 MIN: CPT | Performed by: FAMILY MEDICINE

## 2023-06-09 RX ORDER — PREDNISOLONE SODIUM PHOSPHATE 15 MG/5ML
30 SOLUTION ORAL DAILY
Qty: 70 ML | Refills: 0 | Status: SHIPPED | OUTPATIENT
Start: 2023-06-09 | End: 2023-06-16

## 2023-06-10 NOTE — PROGRESS NOTES
Name: Jameson Serrano      : 2005      MRN: [id number]  Encounter Provider: Miguel Ángel Schaeffer MD  Encounter Date: 2023   Encounter department: [address] [place] [address]  Chronic right shoulder pain  Assessment & Plan:  She was given prescription for prednisolone  Referral to physical therapy  Was discussed with patient if not improving to call back and I will consider referral to see Ortho  Orders:  -     prednisoLONE (ORAPRED) 15 mg/5 mL oral solution; Take 10 mL (30 mg total) by mouth daily for 7 days  -     Ambulatory Referral to Physical Therapy; Future    2  Gastroesophageal reflux disease without esophagitis  Assessment & Plan:  Stable  Continue with lifestyle changes  Continue to monitor  3  Asthma, unspecified asthma severity, unspecified whether complicated, unspecified whether persistent  Assessment & Plan:  Fair control on albuterol as needed  We will continue to monitor  4  Other depression  Assessment & Plan:  Stable  We will continue to monitor  5  Healthcare maintenance  Assessment & Plan: It was discussed about immunizations, diet, exercise measures  6  Vitamin D deficiency  Assessment & Plan:  Continue on vitamin D  We will continue to monitor  Orders:  -     Vitamin D 25 hydroxy; Future    7  Other iron deficiency anemia  -     CBC and differential; Future  -     TSH, 3rd generation with Free T4 reflex; Future  -     Iron Panel (Includes Ferritin, Iron Sat%, Iron, and TIBC); Future           Subjective     She is here today for complaint of right shoulder pain  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  She denies any recent history of injury or trauma  She stated more than a year ago she fell on her shoulder and since then has been having some pain on and off  She had x-ray at that time and it was not showing any fracture    Her last blood work was done in December showed normal iron but her vitamin D was low  Review of Systems   Constitutional: Negative for chills and fever  HENT: Negative for trouble swallowing  Eyes: Negative for visual disturbance  Respiratory: Negative for cough and shortness of breath  Cardiovascular: Negative for chest pain, palpitations and leg swelling  Gastrointestinal: Negative for abdominal pain, constipation and diarrhea  Endocrine: Negative for cold intolerance and heat intolerance  Genitourinary: Negative for difficulty urinating and dysuria  Musculoskeletal: Positive for arthralgias (right shoulder pain)  Negative for gait problem  Skin: Negative for rash  Neurological: Negative for dizziness, tremors, seizures and headaches  Hematological: Negative for adenopathy  Psychiatric/Behavioral: Negative for behavioral problems         Past Medical History:   Diagnosis Date   • Acne    • ADHD    • Anxiety    • Asthma    • Concussion     X 2 IN 2017   • Depression    • Fracture of lumbar spine (HCC)    • GERD (gastroesophageal reflux disease)    • Hydronephrosis    • Pertussis    • Stress fracture    • Vesicoureteral reflux      Past Surgical History:   Procedure Laterality Date   • CYST REMOVAL      Left forearm   • WISDOM TOOTH EXTRACTION       Family History   Problem Relation Age of Onset   • Hypertension Mother    • Heart murmur Mother    • Depression Mother    • Anxiety disorder Mother    • Celiac disease Father    • ADD / ADHD Father    • Cancer Paternal Grandmother         adrenal gland   • Celiac disease Paternal Grandfather    • Anxiety disorder Sister    • Depression Sister    • ADD / ADHD Sister      Social History     Socioeconomic History   • Marital status: Single     Spouse name: None   • Number of children: None   • Years of education: None   • Highest education level: None   Occupational History   • Occupation: STUDENT   Tobacco Use   • Smoking status: Never   • Smokeless tobacco: Former   Vaping Use   • Vaping Use: Former Substance and Sexual Activity   • Alcohol use: Never   • Drug use: Never   • Sexual activity: None   Other Topics Concern   • None   Social History Narrative   • None     Social Determinants of Health     Financial Resource Strain: Not on file   Food Insecurity: Not on file   Transportation Needs: No Transportation Needs (2/5/2020)    PRAPARE - Transportation    • Lack of Transportation (Medical): No    • Lack of Transportation (Non-Medical): No   Physical Activity: Insufficiently Active (2/5/2020)    Exercise Vital Sign    • Days of Exercise per Week: 2 days    • Minutes of Exercise per Session: 20 min   Stress: No Stress Concern Present (2/5/2020)    [address]    • Feeling of Stress :  Only a little   Social Connections: Unknown (2/5/2020)    Social Connection and Isolation Panel [NHANES]    • Frequency of Communication with Friends and Family: More than three times a week    • Frequency of Social Gatherings with Friends and Family: More than three times a week    • Attends Ep[employer] Services: 1 to 4 times per year    • Active Member of Clubs or Organizations: No    • Attends Club or Organization Meetings: Never    • Marital Status: Not on file   Intimate Partner Violence: Not At Risk (2/5/2020)    Humiliation, Afraid, Rape, and Kick questionnaire    • Fear of Current or Ex-Partner: No    • Emotionally Abused: No    • Physically Abused: No    • Sexually Abused: No   Housing Stability: Not on file     Current Outpatient Medications on File Prior to Visit   Medication Sig   • albuterol (PROVENTIL HFA,VENTOLIN HFA) 90 mcg/act inhaler TAKE 2 PUFFS BY MOUTH EVERY 6 HOURS AS NEEDED FOR WHEEZE   • fluticasone (FLONASE) 50 mcg/act nasal spray SPRAY 1 SPRAY INTO EACH NOSTRIL EVERY DAY   • loratadine (CLARITIN) 10 mg tablet TAKE 1 TABLET BY MOUTH EVERY DAY   • medroxyPROGESTERone acetate (DEPO-PROVERA SYRINGE) 150 mg/mL injection INJECT 150 MG INTRAMUSCULAR "EVERY 12 WEEKS   • nystatin (MYCOSTATIN) cream Apply 1 application topically 2 (two) times a day To affected area   • tretinoin (RETIN-A) 0 025 % cream APPLY EVERY NIGHT AT BEDTIME   • Glycopyrronium Tosylate 2 4 % PADS Apply topically   • sertraline (ZOLOFT) 50 mg tablet TAKE 1 TABLET BY MOUTH EVERY DAY (Patient not taking: Reported on 6/9/2023)   • [DISCONTINUED] ergocalciferol (VITAMIN D2) 50,000 units TAKE 1 CAPSULE BY MOUTH ONE TIME PER WEEK (Patient not taking: Reported on 6/9/2023)   • [DISCONTINUED] prednisoLONE (PRELONE) 15 MG/5ML syrup Take 10 ml daily for 5 days       Allergies   Allergen Reactions   • Kiwi Extract - Food Allergy Shortness Of Breath   • [place] Flavor - Food Allergy Itching     Immunization History   Administered Date(s) Administered   • DTaP 2003, 02/19/2004, 05/19/2004, 03/15/2005, 08/22/2008   • HPV Quadrivalent 08/20/2014, 11/04/2014   • HPV9 06/18/2019   • Hep B, Adolescent or Pediatric 2003, 2003, 05/19/2004   • Hepatitis A 08/30/2012, 08/23/2013   • HiB 2003, 2003, 02/19/2004, 11/22/2004   • Hib (PRP-T) 2003, 2003, 02/19/2004, 11/22/2004   • INFLUENZA 10/12/2004, 11/22/2004, 11/01/2006, 08/22/2008, 10/02/2009, 08/20/2010, 08/22/2011, 08/30/2012, 08/23/2013, 11/04/2014, 10/02/2015, 09/27/2016, 11/05/2017, 10/09/2018, 09/17/2019, 09/29/2020, 09/24/2021   • IPV 2003, 2003, 03/15/2005, 08/22/2008   • Influenza, injectable, quadrivalent, preservative free 0 5 mL 09/17/2019, 09/29/2020, 09/24/2021   • MMR 08/20/2004, 08/30/2007   • Meningococcal MCV4P 08/20/2014, 03/17/2020   • Pneumococcal Conjugate PCV 7 2003, 2003, 02/19/2004, 11/22/2004   • Tdap 08/20/2014   • Tuberculin Skin Test-PPD Intradermal 02/25/2022   • Varicella 08/20/2004, 08/30/2007       Objective     /80 (BP Location: Left arm, Patient Position: Sitting, Cuff Size: Adult)   Pulse 97   Temp 98 3 °F (36 8 °C) (Tympanic)   Resp 16   Ht 5' 1\" (1 549 m)  " Wt 65 8 kg (145 lb)   SpO2 95%   BMI 27 40 kg/m²     Physical Exam  Vitals and nursing note reviewed  Constitutional:       Appearance: She is well-developed  HENT:      Head: Normocephalic and atraumatic  Eyes:      Pupils: Pupils are equal, round, and reactive to light  Cardiovascular:      Rate and Rhythm: Normal rate and regular rhythm  Heart sounds: Normal heart sounds  Pulmonary:      Effort: Pulmonary effort is normal       Breath sounds: Normal breath sounds  Abdominal:      General: Bowel sounds are normal       Palpations: Abdomen is soft  Musculoskeletal:         General: Tenderness (right shoulder tenderness with limited range of motion) present  Normal range of motion  Cervical back: Normal range of motion and neck supple  Lymphadenopathy:      Cervical: No cervical adenopathy  Skin:     General: Skin is warm  Neurological:      Mental Status: She is alert and oriented to person, place, and time  Cranial Nerves: No cranial nerve deficit         Swapna Everett MD

## 2023-06-11 PROBLEM — G89.29 CHRONIC RIGHT SHOULDER PAIN: Status: ACTIVE | Noted: 2023-06-11

## 2023-06-11 PROBLEM — M25.511 CHRONIC RIGHT SHOULDER PAIN: Status: ACTIVE | Noted: 2023-06-11

## 2023-06-11 NOTE — ASSESSMENT & PLAN NOTE
She was given prescription for prednisolone  Referral to physical therapy  Was discussed with patient if not improving to call back and I will consider referral to see Ortho

## 2023-07-20 DIAGNOSIS — F32.A DEPRESSION, UNSPECIFIED DEPRESSION TYPE: ICD-10-CM

## 2023-08-10 PROBLEM — Z00.00 HEALTHCARE MAINTENANCE: Status: RESOLVED | Noted: 2022-07-20 | Resolved: 2023-08-10

## 2023-08-11 ENCOUNTER — OFFICE VISIT (OUTPATIENT)
Dept: FAMILY MEDICINE CLINIC | Facility: CLINIC | Age: 18
End: 2023-08-11
Payer: COMMERCIAL

## 2023-08-11 VITALS
RESPIRATION RATE: 16 BRPM | WEIGHT: 153 LBS | TEMPERATURE: 99.4 F | BODY MASS INDEX: 28.89 KG/M2 | DIASTOLIC BLOOD PRESSURE: 78 MMHG | OXYGEN SATURATION: 94 % | HEIGHT: 61 IN | SYSTOLIC BLOOD PRESSURE: 128 MMHG | HEART RATE: 124 BPM

## 2023-08-11 DIAGNOSIS — J45.909 ASTHMA, UNSPECIFIED ASTHMA SEVERITY, UNSPECIFIED WHETHER COMPLICATED, UNSPECIFIED WHETHER PERSISTENT: ICD-10-CM

## 2023-08-11 DIAGNOSIS — E55.9 VITAMIN D DEFICIENCY: ICD-10-CM

## 2023-08-11 DIAGNOSIS — F32.89 OTHER DEPRESSION: ICD-10-CM

## 2023-08-11 DIAGNOSIS — F41.1 GAD (GENERALIZED ANXIETY DISORDER): ICD-10-CM

## 2023-08-11 DIAGNOSIS — Z00.01 ABNORMAL WELLNESS EXAM: ICD-10-CM

## 2023-08-11 DIAGNOSIS — D50.8 OTHER IRON DEFICIENCY ANEMIA: Primary | ICD-10-CM

## 2023-08-11 DIAGNOSIS — E66.09 CLASS 1 OBESITY DUE TO EXCESS CALORIES WITH SERIOUS COMORBIDITY AND BODY MASS INDEX (BMI) OF 30.0 TO 30.9 IN ADULT: ICD-10-CM

## 2023-08-11 PROBLEM — E66.811 CLASS 1 OBESITY DUE TO EXCESS CALORIES WITH SERIOUS COMORBIDITY AND BODY MASS INDEX (BMI) OF 30.0 TO 30.9 IN ADULT: Status: ACTIVE | Noted: 2023-08-11

## 2023-08-11 PROCEDURE — 99214 OFFICE O/P EST MOD 30 MIN: CPT | Performed by: FAMILY MEDICINE

## 2023-08-11 PROCEDURE — 99395 PREV VISIT EST AGE 18-39: CPT | Performed by: FAMILY MEDICINE

## 2023-08-11 NOTE — ASSESSMENT & PLAN NOTE
I am going to start her on Wegovy 0.5 mg weekly. It was discussed with possible side effects. We discussed about low-carb diet and regular exercise. We will continue to monitor.

## 2023-08-11 NOTE — PROGRESS NOTES
Name: Reid Muñoz      : 2005      MRN: [id number]  Encounter Provider: Shaunna Molina MD  Encounter Date: 2023   Encounter department: [facility]     1. Other iron deficiency anemia  Assessment & Plan: We will continue to monitor iron panel. Orders:  -     CBC and differential; Future  -     Iron Panel (Includes Ferritin, Iron Sat%, Iron, and TIBC); Future    2. Class 1 obesity due to excess calories with serious comorbidity and body mass index (BMI) of 30.0 to 30.9 in adult  Assessment & Plan:  I am going to start her on Wegovy 0.5 mg weekly. It was discussed with possible side effects. We discussed about low-carb diet and regular exercise. We will continue to monitor. Orders:  -     Semaglutide-Weight Management (WEGOVY) 0.25 MG/0.5ML; Inject 0.5 mL (0.25 mg total) under the skin once a week for 28 days  -     Insulin, fasting; Future    3. Abnormal wellness exam  Assessment & Plan: It was discussed about immunizations, diet, exercise and safety measures. Orders:  -     Comprehensive metabolic panel; Future  -     TSH, 3rd generation with Free T4 reflex; Future    4. Vitamin D deficiency  Assessment & Plan:  I went to check her vitamin D level. Orders:  -     Vitamin D 25 hydroxy; Future    5. Other depression  Assessment & Plan:  Stable. Continue same. We will continue to monitor. Orders:  -     Comprehensive metabolic panel; Future  -     TSH, 3rd generation with Free T4 reflex; Future    6. Asthma, unspecified asthma severity, unspecified whether complicated, unspecified whether persistent  Assessment & Plan:  Stable. Continue same. We will continue to monitor. 7. REUBEN (generalized anxiety disorder)  Assessment & Plan:  Stable on Zoloft. Continue same. We will continue to monitor.       8. BMI 28.0-28.9,adult           Subjective     She is here today for follow-up multiple medical problems and complaint of having problems with gain. She is on Depo Provera and she thinks is affecting her weight. She is doing well on Zoloft for her depression. She has history of iron deficiency anemia and she is due for blood work. Review of Systems   Constitutional: Positive for unexpected weight change. Negative for chills and fever. HENT: Negative for trouble swallowing. Eyes: Negative for visual disturbance. Respiratory: Negative for cough and shortness of breath. Cardiovascular: Negative for chest pain, palpitations and leg swelling. Gastrointestinal: Negative for abdominal pain, constipation and diarrhea. Endocrine: Negative for cold intolerance and heat intolerance. Genitourinary: Negative for difficulty urinating and dysuria. Musculoskeletal: Negative for gait problem. Skin: Negative for rash. Neurological: Negative for dizziness, tremors, seizures and headaches. Hematological: Negative for adenopathy. Psychiatric/Behavioral: Negative for behavioral problems.        Past Medical History:   Diagnosis Date   • Acne    • ADHD    • Anxiety    • Asthma    • Concussion     X 2 IN 2017   • Depression    • Fracture of lumbar spine (HCC)    • GERD (gastroesophageal reflux disease)    • Hydronephrosis    • Pertussis    • Stress fracture    • Vesicoureteral reflux      Past Surgical History:   Procedure Laterality Date   • CYST REMOVAL      Left forearm   • WISDOM TOOTH EXTRACTION       Family History   Problem Relation Age of Onset   • Hypertension Mother    • Heart murmur Mother    • Depression Mother    • Anxiety disorder Mother    • Celiac disease Father    • ADD / ADHD Father    • Cancer Paternal Grandmother         adrenal gland   • Celiac disease Paternal Grandfather    • Anxiety disorder Sister    • Depression Sister    • ADD / ADHD Sister      Social History     Socioeconomic History   • Marital status: Single     Spouse name: None   • Number of children: None   • Years of education: None   • Highest education level: None   Occupational History   • Occupation: STUDENT   Tobacco Use   • Smoking status: Never   • Smokeless tobacco: Former   Vaping Use   • Vaping Use: Former   Substance and Sexual Activity   • Alcohol use: Never   • Drug use: Never   • Sexual activity: None   Other Topics Concern   • None   Social History Narrative   • None     Social Determinants of Health     Financial Resource Strain: Not on file   Food Insecurity: Not on file   Transportation Needs: No Transportation Needs (2/5/2020)    PRAPARE - Transportation    • Lack of Transportation (Medical): No    • Lack of Transportation (Non-Medical): No   Physical Activity: Insufficiently Active (2/5/2020)    Exercise Vital Sign    • Days of Exercise per Week: 2 days    • Minutes of Exercise per Session: 20 min   Stress: No Stress Concern Present (2/5/2020)    [address] [facility]    • Feeling of Stress :  Only a little   Social Connections: Unknown (2/5/2020)    Social Connection and Isolation Panel [NHANES]    • Frequency of Communication with Friends and Family: More than three times a week    • Frequency of Social Gatherings with Friends and Family: More than three times a week    • Attends Hinduism Services: 1 to 4 times per year    • Active Member of Clubs or Organizations: No    • Attends Club or Organization Meetings: Never    • Marital Status: Not on file   Intimate Partner Violence: Not At Risk (2/5/2020)    Humiliation, Afraid, Rape, and Kick questionnaire    • Fear of Current or Ex-Partner: No    • Emotionally Abused: No    • Physically Abused: No    • Sexually Abused: No   Housing Stability: Not on file     Current Outpatient Medications on File Prior to Visit   Medication Sig   • albuterol (PROVENTIL HFA,VENTOLIN HFA) 90 mcg/act inhaler TAKE 2 PUFFS BY MOUTH EVERY 6 HOURS AS NEEDED FOR WHEEZE   • fluticasone (FLONASE) 50 mcg/act nasal spray SPRAY 1 SPRAY INTO EACH NOSTRIL EVERY DAY • Glycopyrronium Tosylate 2.4 % PADS Apply topically   • loratadine (CLARITIN) 10 mg tablet TAKE 1 TABLET BY MOUTH EVERY DAY   • medroxyPROGESTERone acetate (DEPO-PROVERA SYRINGE) 150 mg/mL injection INJECT 150 MG INTRAMUSCULAR EVERY 12 WEEKS   • nystatin (MYCOSTATIN) cream Apply 1 application topically 2 (two) times a day To affected area   • sertraline (ZOLOFT) 50 mg tablet TAKE 1 TABLET BY MOUTH EVERY DAY   • tretinoin (RETIN-A) 0.025 % cream APPLY EVERY NIGHT AT BEDTIME     Allergies   Allergen Reactions   • Kiwi Extract - Food Allergy Shortness Of Breath   • [place] Flavor - Food Allergy Itching     Immunization History   Administered Date(s) Administered   • DTaP 2003, 02/19/2004, 05/19/2004, 03/15/2005, 08/22/2008   • HPV Quadrivalent 08/20/2014, 11/04/2014   • HPV9 06/18/2019   • Hep B, Adolescent or Pediatric 2003, 2003, 05/19/2004   • Hepatitis A 08/30/2012, 08/23/2013   • HiB 2003, 2003, 02/19/2004, 11/22/2004   • Hib (PRP-T) 2003, 2003, 02/19/2004, 11/22/2004   • INFLUENZA 10/12/2004, 11/22/2004, 11/01/2006, 08/22/2008, 10/02/2009, 08/20/2010, 08/22/2011, 08/30/2012, 08/23/2013, 11/04/2014, 10/02/2015, 09/27/2016, 11/05/2017, 10/09/2018, 09/17/2019, 09/29/2020, 09/24/2021   • IPV 2003, 2003, 03/15/2005, 08/22/2008   • Influenza, injectable, quadrivalent, preservative free 0.5 mL 09/17/2019, 09/29/2020, 09/24/2021   • MMR 08/20/2004, 08/30/2007   • Meningococcal MCV4, Unspecified 08/20/2014, 03/17/2020   • Meningococcal MCV4P 08/20/2014, 03/17/2020   • Pneumococcal Conjugate PCV 7 2003, 2003, 02/19/2004, 11/22/2004   • Tdap 08/20/2014   • Tuberculin Skin Test-PPD Intradermal 02/25/2022   • Varicella 08/20/2004, 08/30/2007       Objective     /78 (BP Location: Left arm, Patient Position: Sitting, Cuff Size: Adult)   Pulse (!) 124   Temp 99.4 °F (37.4 °C) (Tympanic)   Resp 16   Ht 5' 1" (1.549 m)   Wt 69.4 kg (153 lb)   SpO2 94% BMI 28.91 kg/m²     Physical Exam  Vitals and nursing note reviewed. Constitutional:       Appearance: She is well-developed. HENT:      Head: Normocephalic and atraumatic. Eyes:      Pupils: Pupils are equal, round, and reactive to light. Cardiovascular:      Rate and Rhythm: Normal rate and regular rhythm. Heart sounds: Normal heart sounds. Pulmonary:      Effort: Pulmonary effort is normal.      Breath sounds: Normal breath sounds. Abdominal:      General: Bowel sounds are normal.      Palpations: Abdomen is soft. Musculoskeletal:         General: Normal range of motion. Cervical back: Normal range of motion and neck supple. Lymphadenopathy:      Cervical: No cervical adenopathy. Skin:     General: Skin is warm. Neurological:      Mental Status: She is alert and oriented to person, place, and time. Cranial Nerves: No cranial nerve deficit. Hallie Das MD BMI Counseling: Body mass index is 28.91 kg/m². The BMI is above normal. Nutrition recommendations include reducing portion sizes, decreasing overall calorie intake and 3-5 servings of fruits/vegetables daily. Exercise recommendations include moderate aerobic physical activity for 150 minutes/week.

## 2023-08-11 NOTE — PROGRESS NOTES
Name: Chasity Em      : 2005      MRN: [id number]  Encounter Provider: Silvana Ramos MD  Encounter Date: 2023   Encounter department: [facility]     1. Other iron deficiency anemia  Assessment & Plan: We will continue to monitor iron panel. Orders:  -     CBC and differential; Future  -     Iron Panel (Includes Ferritin, Iron Sat%, Iron, and TIBC); Future    2. Class 1 obesity due to excess calories with serious comorbidity and body mass index (BMI) of 30.0 to 30.9 in adult  Assessment & Plan:  I am going to start her on Wegovy 0.5 mg weekly. It was discussed with possible side effects. We discussed about low-carb diet and regular exercise. We will continue to monitor. Orders:  -     Semaglutide-Weight Management (WEGOVY) 0.25 MG/0.5ML; Inject 0.5 mL (0.25 mg total) under the skin once a week for 28 days  -     Insulin, fasting; Future    3. Abnormal wellness exam  Assessment & Plan: It was discussed about immunizations, diet, exercise and safety measures. Orders:  -     Comprehensive metabolic panel; Future  -     TSH, 3rd generation with Free T4 reflex; Future    4. Vitamin D deficiency  Assessment & Plan:  I went to check her vitamin D level. Orders:  -     Vitamin D 25 hydroxy; Future    5. Other depression  Assessment & Plan:  Stable. Continue same. We will continue to monitor. Orders:  -     Comprehensive metabolic panel; Future  -     TSH, 3rd generation with Free T4 reflex; Future    6. Asthma, unspecified asthma severity, unspecified whether complicated, unspecified whether persistent  Assessment & Plan:  Stable. Continue same. We will continue to monitor. 7. REUBEN (generalized anxiety disorder)  Assessment & Plan:  Stable on Zoloft. Continue same. We will continue to monitor.       8. BMI 28.0-28.9,adult           Subjective     She is here today for follow-up multiple medical problems and complaint of having problems with gain. She is on Depo Provera and she thinks is affecting her weight. She is doing well on Zoloft for her depression. She has history of iron deficiency anemia and she is due for blood work. Review of Systems   Constitutional: Positive for unexpected weight change. Negative for chills and fever. HENT: Negative for trouble swallowing. Eyes: Negative for visual disturbance. Respiratory: Negative for cough and shortness of breath. Cardiovascular: Negative for chest pain, palpitations and leg swelling. Gastrointestinal: Negative for abdominal pain, constipation and diarrhea. Endocrine: Negative for cold intolerance and heat intolerance. Genitourinary: Negative for difficulty urinating and dysuria. Musculoskeletal: Negative for gait problem. Skin: Negative for rash. Neurological: Negative for dizziness, tremors, seizures and headaches. Hematological: Negative for adenopathy. Psychiatric/Behavioral: Negative for behavioral problems.        Past Medical History:   Diagnosis Date   • Acne    • ADHD    • Anxiety    • Asthma    • Concussion     X 2 IN 2017   • Depression    • Fracture of lumbar spine (HCC)    • GERD (gastroesophageal reflux disease)    • Hydronephrosis    • Pertussis    • Stress fracture    • Vesicoureteral reflux      Past Surgical History:   Procedure Laterality Date   • CYST REMOVAL      Left forearm   • WISDOM TOOTH EXTRACTION       Family History   Problem Relation Age of Onset   • Hypertension Mother    • Heart murmur Mother    • Depression Mother    • Anxiety disorder Mother    • Celiac disease Father    • ADD / ADHD Father    • Cancer Paternal Grandmother         adrenal gland   • Celiac disease Paternal Grandfather    • Anxiety disorder Sister    • Depression Sister    • ADD / ADHD Sister      Social History     Socioeconomic History   • Marital status: Single     Spouse name: None   • Number of children: None   • Years of education: None   • Highest education level: None   Occupational History   • Occupation: STUDENT   Tobacco Use   • Smoking status: Never   • Smokeless tobacco: Former   Vaping Use   • Vaping Use: Former   Substance and Sexual Activity   • Alcohol use: Never   • Drug use: Never   • Sexual activity: None   Other Topics Concern   • None   Social History Narrative   • None     Social Determinants of Health     Financial Resource Strain: Not on file   Food Insecurity: Not on file   Transportation Needs: No Transportation Needs (2/5/2020)    PRAPARE - Transportation    • Lack of Transportation (Medical): No    • Lack of Transportation (Non-Medical): No   Physical Activity: Insufficiently Active (2/5/2020)    Exercise Vital Sign    • Days of Exercise per Week: 2 days    • Minutes of Exercise per Session: 20 min   Stress: No Stress Concern Present (2/5/2020)    [facility]    • Feeling of Stress :  Only a little   Social Connections: Unknown (2/5/2020)    Social Connection and Isolation Panel [NHANES]    • Frequency of Communication with Friends and Family: More than three times a week    • Frequency of Social Gatherings with Friends and Family: More than three times a week    • Attends Hindu Services: 1 to 4 times per year    • Active Member of Clubs or Organizations: No    • Attends Club or Organization Meetings: Never    • Marital Status: Not on file   Intimate Partner Violence: Not At Risk (2/5/2020)    Humiliation, Afraid, Rape, and Kick questionnaire    • Fear of Current or Ex-Partner: No    • Emotionally Abused: No    • Physically Abused: No    • Sexually Abused: No   Housing Stability: Not on file     Current Outpatient Medications on File Prior to Visit   Medication Sig   • albuterol (PROVENTIL HFA,VENTOLIN HFA) 90 mcg/act inhaler TAKE 2 PUFFS BY MOUTH EVERY 6 HOURS AS NEEDED FOR WHEEZE   • fluticasone (FLONASE) 50 mcg/act nasal spray SPRAY 1 SPRAY INTO EACH NOSTRIL EVERY DAY • Glycopyrronium Tosylate 2.4 % PADS Apply topically   • loratadine (CLARITIN) 10 mg tablet TAKE 1 TABLET BY MOUTH EVERY DAY   • medroxyPROGESTERone acetate (DEPO-PROVERA SYRINGE) 150 mg/mL injection INJECT 150 MG INTRAMUSCULAR EVERY 12 WEEKS   • nystatin (MYCOSTATIN) cream Apply 1 application topically 2 (two) times a day To affected area   • sertraline (ZOLOFT) 50 mg tablet TAKE 1 TABLET BY MOUTH EVERY DAY   • tretinoin (RETIN-A) 0.025 % cream APPLY EVERY NIGHT AT BEDTIME     Allergies   Allergen Reactions   • Kiwi Extract - Food Allergy Shortness Of Breath   • [place] Flavor - Food Allergy Itching     Immunization History   Administered Date(s) Administered   • DTaP 2005, 02/19/2004, 05/19/2004, 03/15/2005, 08/22/2008   • HPV Quadrivalent 08/20/2014, 11/04/2014   • HPV9 06/18/2019   • Hep B, Adolescent or Pediatric 2005, 2005, 05/19/2004   • Hepatitis A 08/30/2012, 08/23/2013   • HiB 2005, 2005, 02/19/2004, 11/22/2004   • Hib (PRP-T) 2005, 2005, 02/19/2004, 11/22/2004   • INFLUENZA 10/12/2004, 11/22/2004, 11/01/2006, 08/22/2008, 10/02/2009, 08/20/2010, 08/22/2011, 08/30/2012, 08/23/2013, 11/04/2014, 10/02/2015, 09/27/2016, 11/05/2017, 10/09/2018, 09/17/2019, 09/29/2020, 09/24/2021   • IPV 2005, 2005, 03/15/2005, 08/22/2008   • Influenza, injectable, quadrivalent, preservative free 0.5 mL 09/17/2019, 09/29/2020, 09/24/2021   • MMR 08/20/2004, 08/30/2007   • Meningococcal MCV4, Unspecified 08/20/2014, 03/17/2020   • Meningococcal MCV4P 08/20/2014, 03/17/2020   • Pneumococcal Conjugate PCV 7 2003, 2003, 02/19/2004, 11/22/2004   • Tdap 08/20/2014   • Tuberculin Skin Test-PPD Intradermal 02/25/2022   • Varicella 08/20/2004, 08/30/2007       Objective     /78 (BP Location: Left arm, Patient Position: Sitting, Cuff Size: Adult)   Pulse (!) 124   Temp 99.4 °F (37.4 °C) (Tympanic)   Resp 16   Ht 5' 1" (1.549 m)   Wt 69.4 kg (153 lb)   SpO2 94% BMI 28.91 kg/m²     Physical Exam  Vitals and nursing note reviewed. Constitutional:       Appearance: She is well-developed. HENT:      Head: Normocephalic and atraumatic. Eyes:      Pupils: Pupils are equal, round, and reactive to light. Cardiovascular:      Rate and Rhythm: Normal rate and regular rhythm. Heart sounds: Normal heart sounds. Pulmonary:      Effort: Pulmonary effort is normal.      Breath sounds: Normal breath sounds. Abdominal:      General: Bowel sounds are normal.      Palpations: Abdomen is soft. Musculoskeletal:         General: Normal range of motion. Cervical back: Normal range of motion and neck supple. Lymphadenopathy:      Cervical: No cervical adenopathy. Skin:     General: Skin is warm. Neurological:      Mental Status: She is alert and oriented to person, place, and time. Cranial Nerves: No cranial nerve deficit. Oneil Francis MD BMI Counseling: Body mass index is 28.91 kg/m². The BMI is above normal. Nutrition recommendations include reducing portion sizes, decreasing overall calorie intake and 3-5 servings of fruits/vegetables daily. Exercise recommendations include moderate aerobic physical activity for 150 minutes/week.

## 2023-08-14 ENCOUNTER — APPOINTMENT (OUTPATIENT)
Dept: LAB | Facility: IMAGING CENTER | Age: 18
End: 2023-08-14
Payer: COMMERCIAL

## 2023-08-14 DIAGNOSIS — Z00.01 ABNORMAL WELLNESS EXAM: ICD-10-CM

## 2023-08-14 DIAGNOSIS — E66.09 CLASS 1 OBESITY DUE TO EXCESS CALORIES WITH SERIOUS COMORBIDITY AND BODY MASS INDEX (BMI) OF 30.0 TO 30.9 IN ADULT: ICD-10-CM

## 2023-08-14 DIAGNOSIS — E55.9 VITAMIN D INSUFFICIENCY: ICD-10-CM

## 2023-08-14 DIAGNOSIS — E55.9 VITAMIN D DEFICIENCY: ICD-10-CM

## 2023-08-14 DIAGNOSIS — F32.89 OTHER DEPRESSION: ICD-10-CM

## 2023-08-14 DIAGNOSIS — D50.8 OTHER IRON DEFICIENCY ANEMIA: ICD-10-CM

## 2023-08-14 DIAGNOSIS — R53.83 OTHER FATIGUE: ICD-10-CM

## 2023-08-14 LAB
25(OH)D3 SERPL-MCNC: 20.7 NG/ML (ref 30–100)
ALBUMIN SERPL BCP-MCNC: 3.9 G/DL (ref 3.5–5)
ALP SERPL-CCNC: 97 U/L (ref 46–384)
ALT SERPL W P-5'-P-CCNC: 38 U/L (ref 12–78)
ANION GAP SERPL CALCULATED.3IONS-SCNC: 6 MMOL/L
AST SERPL W P-5'-P-CCNC: 20 U/L (ref 5–45)
BASOPHILS # BLD AUTO: 0.09 THOUSANDS/ÂΜL (ref 0–0.1)
BASOPHILS NFR BLD AUTO: 1 % (ref 0–1)
BILIRUB SERPL-MCNC: 0.27 MG/DL (ref 0.2–1)
BUN SERPL-MCNC: 9 MG/DL (ref 5–25)
CALCIUM SERPL-MCNC: 9.4 MG/DL (ref 8.3–10.1)
CHLORIDE SERPL-SCNC: 110 MMOL/L (ref 96–108)
CO2 SERPL-SCNC: 24 MMOL/L (ref 21–32)
CREAT SERPL-MCNC: 0.66 MG/DL (ref 0.6–1.3)
EOSINOPHIL # BLD AUTO: 0.51 THOUSAND/ÂΜL (ref 0–0.61)
EOSINOPHIL NFR BLD AUTO: 7 % (ref 0–6)
ERYTHROCYTE [DISTWIDTH] IN BLOOD BY AUTOMATED COUNT: 12.3 % (ref 11.6–15.1)
FERRITIN SERPL-MCNC: 55 NG/ML (ref 11–307)
GFR SERPL CREATININE-BSD FRML MDRD: 128 ML/MIN/1.73SQ M
GLUCOSE P FAST SERPL-MCNC: 92 MG/DL (ref 65–99)
HCT VFR BLD AUTO: 38.7 % (ref 34.8–46.1)
HGB BLD-MCNC: 12.7 G/DL (ref 11.5–15.4)
IMM GRANULOCYTES # BLD AUTO: 0.01 THOUSAND/UL (ref 0–0.2)
IMM GRANULOCYTES NFR BLD AUTO: 0 % (ref 0–2)
INSULIN SERPL-ACNC: 4.63 UIU/ML (ref 1.9–23)
IRON SATN MFR SERPL: 21 % (ref 15–50)
IRON SERPL-MCNC: 70 UG/DL (ref 50–170)
LYMPHOCYTES # BLD AUTO: 2.24 THOUSANDS/ÂΜL (ref 0.6–4.47)
LYMPHOCYTES NFR BLD AUTO: 29 % (ref 14–44)
MCH RBC QN AUTO: 28.9 PG (ref 26.8–34.3)
MCHC RBC AUTO-ENTMCNC: 32.8 G/DL (ref 31.4–37.4)
MCV RBC AUTO: 88 FL (ref 82–98)
MONOCYTES # BLD AUTO: 0.47 THOUSAND/ÂΜL (ref 0.17–1.22)
MONOCYTES NFR BLD AUTO: 6 % (ref 4–12)
NEUTROPHILS # BLD AUTO: 4.31 THOUSANDS/ÂΜL (ref 1.85–7.62)
NEUTS SEG NFR BLD AUTO: 57 % (ref 43–75)
NRBC BLD AUTO-RTO: 0 /100 WBCS
PLATELET # BLD AUTO: 301 THOUSANDS/UL (ref 149–390)
PMV BLD AUTO: 9.7 FL (ref 8.9–12.7)
POTASSIUM SERPL-SCNC: 3.9 MMOL/L (ref 3.5–5.3)
PROT SERPL-MCNC: 7.6 G/DL (ref 6.4–8.4)
RBC # BLD AUTO: 4.4 MILLION/UL (ref 3.81–5.12)
SODIUM SERPL-SCNC: 140 MMOL/L (ref 135–147)
TIBC SERPL-MCNC: 329 UG/DL (ref 250–450)
TSH SERPL DL<=0.05 MIU/L-ACNC: 1.83 UIU/ML (ref 0.45–4.5)
WBC # BLD AUTO: 7.63 THOUSAND/UL (ref 4.31–10.16)

## 2023-08-14 PROCEDURE — 84443 ASSAY THYROID STIM HORMONE: CPT

## 2023-08-14 PROCEDURE — 83540 ASSAY OF IRON: CPT

## 2023-08-14 PROCEDURE — 85025 COMPLETE CBC W/AUTO DIFF WBC: CPT

## 2023-08-14 PROCEDURE — 82306 VITAMIN D 25 HYDROXY: CPT

## 2023-08-14 PROCEDURE — 82728 ASSAY OF FERRITIN: CPT

## 2023-08-14 PROCEDURE — 83550 IRON BINDING TEST: CPT

## 2023-08-14 PROCEDURE — 36415 COLL VENOUS BLD VENIPUNCTURE: CPT

## 2023-08-14 PROCEDURE — 80053 COMPREHEN METABOLIC PANEL: CPT

## 2023-08-14 PROCEDURE — 83525 ASSAY OF INSULIN: CPT

## 2023-08-23 ENCOUNTER — TELEPHONE (OUTPATIENT)
Dept: FAMILY MEDICINE CLINIC | Facility: CLINIC | Age: 18
End: 2023-08-23

## 2023-08-24 NOTE — TELEPHONE ENCOUNTER
wegovy 0.25mg script was printed on  08/11/23   L/m for pt to call office .  Is she requesting wegovy 0.25mg ro next dose of 0.5mg. to soon to fill wegovy 0.5mg/

## 2023-08-28 ENCOUNTER — TELEPHONE (OUTPATIENT)
Dept: FAMILY MEDICINE CLINIC | Facility: CLINIC | Age: 18
End: 2023-08-28

## 2023-08-29 ENCOUNTER — OFFICE VISIT (OUTPATIENT)
Dept: URGENT CARE | Age: 18
End: 2023-08-29
Payer: COMMERCIAL

## 2023-08-29 VITALS
SYSTOLIC BLOOD PRESSURE: 135 MMHG | TEMPERATURE: 98.4 F | OXYGEN SATURATION: 100 % | HEART RATE: 110 BPM | DIASTOLIC BLOOD PRESSURE: 85 MMHG | RESPIRATION RATE: 18 BRPM

## 2023-08-29 DIAGNOSIS — R10.31 RIGHT LOWER QUADRANT ABDOMINAL PAIN: Primary | ICD-10-CM

## 2023-08-29 PROCEDURE — G0381 LEV 2 HOSP TYPE B ED VISIT: HCPCS | Performed by: EMERGENCY MEDICINE

## 2023-08-29 RX ORDER — AMOXICILLIN 500 MG/1
CAPSULE ORAL
COMMUNITY
Start: 2023-08-24

## 2023-08-29 NOTE — PROGRESS NOTES
[facility]        NAME: Chela Berumen is a 19 y.o. female   : 2005    MRN: [id number]  DATE: 2023  TIME: 5:16 PM    Assessment and Plan   Right lower quadrant abdominal pain [R10.31]  1. Right lower quadrant abdominal pain  Transfer to other facility        -Patient afebrile, mildly tachycardic however does endorse extreme anxiety over what her symptoms could represent  -Advised patient that given right lower quadrant tenderness to palpation I recommend evaluation in the ED by ED provider for further work-up and treatment given that appendicitis is on the differential diagnosis  -Patient is amenable to seeking care at [facility] emergency room at this time, per her preference and patient prefers to drive there via private vehicle;  at time of departure was hemodynamically stable, alert and oriented to person place time and event and in no acute distress      Patient Instructions     -Proceed immediately to the emergency room for further evaluation and work-up    Chief Complaint     Chief Complaint   Patient presents with   • Abdominal Pain     Pt c/o RUQ pain. Pt feels a lump in that area. Pain is persistent, and aching. History of Present Illness       61-year-old female with history of GERD, generalized anxiety disorder, vesicoureteral reflux, depression presents with chief complaint of generalized abdominal pain with onset of 2 to 3 days ago. Patient states that pain woke her up from sleep 2-3 nights ago and has been persistent since onset. Patient recently was seen and diagnosed with strep pharyngitis and is currently taking amoxicillin however denies associated diarrhea, nausea, vomiting bloody stools or abdominal cramping. Denies current pregnancy additionally denies dysuria, hematuria vaginal discharge or bleeding. Has not taken any medication for the pain and states that eating makes it worse. Denies associated fever at home.   Also endorsing decreased appetite and anorexia. Abdominal Pain  This is a new problem. The current episode started in the past 7 days. The onset quality is sudden. The problem occurs constantly. The problem is unchanged. The pain is located in the generalized abdominal region. The pain is at a severity of 4/10. The pain is mild. The quality of the pain is described as aching and dull. The pain radiates to the LUQ and periumbilical region. Associated symptoms include anorexia. Pertinent negatives include no anxiety, arthralgias, belching, constipation, diarrhea, dysuria, fever, flatus, frequency, headaches, hematochezia, hematuria, melena, myalgias, nausea, rash, sore throat or vomiting. Nothing relieves the symptoms. Past treatments include nothing. Review of Systems   Review of Systems   Constitutional: Negative for activity change, appetite change, chills, diaphoresis, fatigue and fever. HENT: Negative for congestion, dental problem, drooling, ear discharge, facial swelling, hearing loss, mouth sores and sore throat. Eyes: Negative for pain and visual disturbance. Respiratory: Negative for cough and shortness of breath. Cardiovascular: Negative for chest pain, palpitations and leg swelling. Gastrointestinal: Positive for abdominal pain and anorexia. Negative for abdominal distention, anal bleeding, blood in stool, constipation, diarrhea, flatus, hematochezia, melena, nausea, rectal pain and vomiting. Genitourinary: Negative for dysuria, frequency and hematuria. Musculoskeletal: Negative for arthralgias, back pain, gait problem, joint swelling, myalgias, neck pain and neck stiffness. Skin: Negative for color change and rash. Neurological: Negative for dizziness, tremors, seizures, syncope, facial asymmetry, speech difficulty, weakness, light-headedness, numbness and headaches. Psychiatric/Behavioral: The patient is not nervous/anxious. All other systems reviewed and are negative.         Current Medications Current Outpatient Medications:   •  albuterol (PROVENTIL HFA,VENTOLIN HFA) 90 mcg/act inhaler, TAKE 2 PUFFS BY MOUTH EVERY 6 HOURS AS NEEDED FOR WHEEZE, Disp: 8.5 Inhaler, Rfl: 4  •  amoxicillin (AMOXIL) 500 mg capsule, TAKE 1 CAPSULE BY MOUTH TWICE A DAY FOR 10 DAYS, Disp: , Rfl:   •  fluticasone (FLONASE) 50 mcg/act nasal spray, SPRAY 1 SPRAY INTO EACH NOSTRIL EVERY DAY, Disp: 16 mL, Rfl: 1  •  Glycopyrronium Tosylate 2.4 % PADS, Apply topically, Disp: , Rfl:   •  loratadine (CLARITIN) 10 mg tablet, TAKE 1 TABLET BY MOUTH EVERY DAY, Disp: 30 tablet, Rfl: 0  •  medroxyPROGESTERone acetate (DEPO-PROVERA SYRINGE) 150 mg/mL injection, INJECT 150 MG INTRAMUSCULAR EVERY 12 WEEKS, Disp: , Rfl: 0  •  nystatin (MYCOSTATIN) cream, Apply 1 application topically 2 (two) times a day To affected area, Disp: , Rfl:   •  Semaglutide-Weight Management (WEGOVY) 0.25 MG/0.5ML, Inject 0.5 mL (0.25 mg total) under the skin once a week for 28 days (Patient not taking: Reported on 8/29/2023), Disp: 2 mL, Rfl: 0  •  sertraline (ZOLOFT) 50 mg tablet, TAKE 1 TABLET BY MOUTH EVERY DAY (Patient not taking: Reported on 8/29/2023), Disp: 90 tablet, Rfl: 0  •  tretinoin (RETIN-A) 0.025 % cream, APPLY EVERY NIGHT AT BEDTIME (Patient not taking: Reported on 8/29/2023), Disp: , Rfl:     Current Allergies     Allergies as of 08/29/2023 - Reviewed 08/29/2023   Allergen Reaction Noted   • Kiwi extract - food allergy Shortness Of Breath 07/25/2018   • [place] flavor - food allergy Itching 06/18/2019            The following portions of the patient's history were reviewed and updated as appropriate: allergies, current medications, past family history, past medical history, past social history, past surgical history and problem list.     Past Medical History:   Diagnosis Date   • Acne    • ADHD    • Anxiety    • Asthma    • Concussion     X 2 IN 2017   • Depression    • Fracture of lumbar spine (HCC)    • GERD (gastroesophageal reflux disease)    • Hydronephrosis    • Pertussis    • Stress fracture    • Vesicoureteral reflux        Past Surgical History:   Procedure Laterality Date   • CYST REMOVAL      Left forearm   • WISDOM TOOTH EXTRACTION         Family History   Problem Relation Age of Onset   • Hypertension Mother    • Heart murmur Mother    • Depression Mother    • Anxiety disorder Mother    • Celiac disease Father    • ADD / ADHD Father    • Cancer Paternal Grandmother         adrenal gland   • Celiac disease Paternal Grandfather    • Anxiety disorder Sister    • Depression Sister    • ADD / ADHD Sister          Medications have been verified. Objective   /85   Pulse (!) 110   Temp 98.4 °F (36.9 °C) (Tympanic)   Resp 18   SpO2 100%   No LMP recorded. Physical Exam     Physical Exam  Vitals and nursing note reviewed. Constitutional:       General: She is not in acute distress. Appearance: Normal appearance. She is not ill-appearing, toxic-appearing or diaphoretic. HENT:      Head: Normocephalic and atraumatic. Right Ear: Tympanic membrane, ear canal and external ear normal. There is no impacted cerumen. Left Ear: Tympanic membrane and external ear normal.      Nose: Congestion and rhinorrhea present. Mouth/Throat:      Mouth: Mucous membranes are moist.      Pharynx: Posterior oropharyngeal erythema present. No oropharyngeal exudate. Eyes:      General:         Right eye: No discharge. Extraocular Movements: Extraocular movements intact. Pupils: Pupils are equal, round, and reactive to light. Cardiovascular:      Rate and Rhythm: Normal rate and regular rhythm. Pulses: Normal pulses. Carotid pulses are 2+ on the right side and 2+ on the left side. Radial pulses are 2+ on the right side and 2+ on the left side. Heart sounds: No murmur heard. Pulmonary:      Effort: Pulmonary effort is normal. No respiratory distress. Breath sounds: Normal breath sounds.  No stridor. No wheezing, rhonchi or rales. Chest:      Chest wall: No tenderness. Abdominal:      General: Abdomen is flat. There is no distension. Palpations: Abdomen is soft. Tenderness: There is abdominal tenderness in the right lower quadrant and periumbilical area. There is no right CVA tenderness, left CVA tenderness, guarding or rebound. Positive signs include McBurney's sign. Negative signs include Call's sign, Rovsing's sign, psoas sign and obturator sign. Hernia: No hernia is present. Musculoskeletal:      Right lower leg: No edema. Left lower leg: No edema. Skin:     General: Skin is warm and dry. Neurological:      General: No focal deficit present. Mental Status: She is alert and oriented to person, place, and time. Cranial Nerves: No cranial nerve deficit. Sensory: No sensory deficit. Motor: No weakness. Coordination: Coordination normal.      Gait: Gait normal.      Deep Tendon Reflexes: Reflexes normal.   Psychiatric:         Mood and Affect: Mood is anxious.

## 2023-09-11 ENCOUNTER — TELEPHONE (OUTPATIENT)
Dept: FAMILY MEDICINE CLINIC | Facility: CLINIC | Age: 18
End: 2023-09-11

## 2023-09-11 NOTE — TELEPHONE ENCOUNTER
----- Message from Asuncion Peralta MD sent at 9/11/2023  7:00 AM EDT -----  Blood work showed low vitamin D.  I recommend vitamin D 2000 unit daily.   All the rest of the blood work came back normal.

## 2023-09-13 ENCOUNTER — CLINICAL SUPPORT (OUTPATIENT)
Dept: FAMILY MEDICINE CLINIC | Facility: CLINIC | Age: 18
End: 2023-09-13
Payer: COMMERCIAL

## 2023-09-13 DIAGNOSIS — Z23 ENCOUNTER FOR IMMUNIZATION: Primary | ICD-10-CM

## 2023-09-13 DIAGNOSIS — Z11.1 ENCOUNTER FOR PPD TEST: ICD-10-CM

## 2023-09-13 PROCEDURE — 90686 IIV4 VACC NO PRSV 0.5 ML IM: CPT

## 2023-09-13 PROCEDURE — 90471 IMMUNIZATION ADMIN: CPT

## 2023-09-13 PROCEDURE — 86580 TB INTRADERMAL TEST: CPT

## 2023-09-13 NOTE — PROGRESS NOTES
Patient was in office for nurse visit for PPD and flu vaccine   Pt received flu vaccine in left deltoid and PPD in RFA

## 2023-09-15 ENCOUNTER — CLINICAL SUPPORT (OUTPATIENT)
Dept: FAMILY MEDICINE CLINIC | Facility: CLINIC | Age: 18
End: 2023-09-15

## 2023-09-15 DIAGNOSIS — Z11.1 ENCOUNTER FOR PPD SKIN TEST READING: Primary | ICD-10-CM

## 2023-09-15 LAB
INDURATION: 0 MM
TB SKIN TEST: NEGATIVE

## 2023-09-18 ENCOUNTER — OFFICE VISIT (OUTPATIENT)
Dept: FAMILY MEDICINE CLINIC | Facility: CLINIC | Age: 18
End: 2023-09-18
Payer: COMMERCIAL

## 2023-09-18 VITALS
HEIGHT: 61 IN | OXYGEN SATURATION: 98 % | WEIGHT: 154 LBS | RESPIRATION RATE: 16 BRPM | HEART RATE: 100 BPM | SYSTOLIC BLOOD PRESSURE: 126 MMHG | TEMPERATURE: 99.6 F | DIASTOLIC BLOOD PRESSURE: 84 MMHG | BODY MASS INDEX: 29.07 KG/M2

## 2023-09-18 DIAGNOSIS — K21.9 GASTROESOPHAGEAL REFLUX DISEASE WITHOUT ESOPHAGITIS: Primary | ICD-10-CM

## 2023-09-18 DIAGNOSIS — R10.11 RIGHT UPPER QUADRANT ABDOMINAL PAIN: ICD-10-CM

## 2023-09-18 DIAGNOSIS — M94.0 COSTOCHONDRITIS: ICD-10-CM

## 2023-09-18 PROCEDURE — 99214 OFFICE O/P EST MOD 30 MIN: CPT | Performed by: FAMILY MEDICINE

## 2023-09-18 RX ORDER — ONDANSETRON 4 MG/1
TABLET, ORALLY DISINTEGRATING ORAL
COMMUNITY
Start: 2023-08-30

## 2023-09-18 RX ORDER — DICLOFENAC SODIUM 75 MG/1
75 TABLET, DELAYED RELEASE ORAL 2 TIMES DAILY
Qty: 60 TABLET | Refills: 0 | Status: SHIPPED | OUTPATIENT
Start: 2023-09-18

## 2023-09-18 RX ORDER — PANTOPRAZOLE SODIUM 40 MG/1
40 TABLET, DELAYED RELEASE ORAL
Qty: 30 TABLET | Refills: 1 | Status: SHIPPED | OUTPATIENT
Start: 2023-09-18

## 2023-09-18 NOTE — ASSESSMENT & PLAN NOTE
She was given prescription for diclofenac 75 mg 1 tablet twice a day. Was told to take it on full stomach.

## 2023-09-18 NOTE — PROGRESS NOTES
Name: Maximino Ornelas      : 2005      MRN: [id number]  Encounter Provider: Uvaldo Santos MD  Encounter Date: 2023   Encounter department: [facility]     1. Gastroesophageal reflux disease without esophagitis  Assessment & Plan:  She was given prescription for pantoprazole 40 mg 1 daily. Discussed about dietary changes. We will continue to monitor. Orders:  -     pantoprazole (PROTONIX) 40 mg tablet; Take 1 tablet (40 mg total) by mouth daily before breakfast    2. Costochondritis  Assessment & Plan:  She was given prescription for diclofenac 75 mg 1 tablet twice a day. Was told to take it on full stomach. Orders:  -     diclofenac (VOLTAREN) 75 mg EC tablet; Take 1 tablet (75 mg total) by mouth 2 (two) times a day    3. Right upper quadrant abdominal pain  Assessment & Plan:  Continue to check abdominal ultrasound    Orders:  -     US abdomen limited; Future; Expected date: 2023           Subjective     She is here today with complaint of left-sided chest pain worse with movements and tender to touch. She denies any recent history of injury or trauma. She works in a  and there was little children and that seems to get worse. She also complains of epigastric abdominal pain with nausea but denies any vomiting. Stated that some kind of food makes her symptoms worse. She denies any change in her bowel movement. Review of Systems   Constitutional: Negative for chills and fever. HENT: Negative for trouble swallowing. Eyes: Negative for visual disturbance. Respiratory: Negative for cough and shortness of breath. Cardiovascular: Positive for chest pain. Negative for palpitations and leg swelling. Gastrointestinal: Positive for abdominal pain and nausea. Negative for constipation and diarrhea. Endocrine: Negative for cold intolerance and heat intolerance. Genitourinary: Negative for difficulty urinating and dysuria. Musculoskeletal: Negative for gait problem. Skin: Negative for rash. Neurological: Negative for dizziness, tremors, seizures and headaches. Hematological: Negative for adenopathy. Psychiatric/Behavioral: Negative for behavioral problems. Past Medical History:   Diagnosis Date   • Acne    • ADHD    • Anxiety    • Asthma    • Concussion     X 2 IN 2017   • Depression    • Fracture of lumbar spine (HCC)    • GERD (gastroesophageal reflux disease)    • Hydronephrosis    • Pertussis    • Stress fracture    • Vesicoureteral reflux      Past Surgical History:   Procedure Laterality Date   • CYST REMOVAL      Left forearm   • WISDOM TOOTH EXTRACTION       Family History   Problem Relation Age of Onset   • Hypertension Mother    • Heart murmur Mother    • Depression Mother    • Anxiety disorder Mother    • Celiac disease Father    • ADD / ADHD Father    • Cancer Paternal Grandmother         adrenal gland   • Celiac disease Paternal Grandfather    • Anxiety disorder Sister    • Depression Sister    • ADD / ADHD Sister      Social History     Socioeconomic History   • Marital status: Single     Spouse name: None   • Number of children: None   • Years of education: None   • Highest education level: None   Occupational History   • Occupation: STUDENT   Tobacco Use   • Smoking status: Never   • Smokeless tobacco: Former   Vaping Use   • Vaping Use: Former   Substance and Sexual Activity   • Alcohol use: Never   • Drug use: Never   • Sexual activity: None   Other Topics Concern   • None   Social History Narrative   • None     Social Determinants of Health     Financial Resource Strain: Not on file   Food Insecurity: Not on file   Transportation Needs: No Transportation Needs (2/5/2020)    PRAPARE - Transportation    • Lack of Transportation (Medical): No    • Lack of Transportation (Non-Medical):  No   Physical Activity: Insufficiently Active (2/5/2020)    Exercise Vital Sign    • Days of Exercise per Week: 2 days • Minutes of Exercise per Session: 20 min   Stress: No Stress Concern Present (2/5/2020)    [address] [facility]    • Feeling of Stress :  Only a little   Social Connections: Unknown (2/5/2020)    Social Connection and Isolation Panel [NHANES]    • Frequency of Communication with Friends and Family: More than three times a week    • Frequency of Social Gatherings with Friends and Family: More than three times a week    • Attends Restorationism Services: 1 to 4 times per year    • Active Member of Clubs or Organizations: No    • Attends Club or Organization Meetings: Never    • Marital Status: Not on file   Intimate Partner Violence: Not At Risk (2/5/2020)    Humiliation, Afraid, Rape, and Kick questionnaire    • Fear of Current or Ex-Partner: No    • Emotionally Abused: No    • Physically Abused: No    • Sexually Abused: No   Housing Stability: Not on file     Current Outpatient Medications on File Prior to Visit   Medication Sig   • albuterol (PROVENTIL HFA,VENTOLIN HFA) 90 mcg/act inhaler TAKE 2 PUFFS BY MOUTH EVERY 6 HOURS AS NEEDED FOR WHEEZE   • fluticasone (FLONASE) 50 mcg/act nasal spray SPRAY 1 SPRAY INTO EACH NOSTRIL EVERY DAY   • Glycopyrronium Tosylate 2.4 % PADS Apply topically   • loratadine (CLARITIN) 10 mg tablet TAKE 1 TABLET BY MOUTH EVERY DAY   • medroxyPROGESTERone acetate (DEPO-PROVERA SYRINGE) 150 mg/mL injection INJECT 150 MG INTRAMUSCULAR EVERY 12 WEEKS   • nystatin (MYCOSTATIN) cream Apply 1 application topically 2 (two) times a day To affected area   • ondansetron (ZOFRAN-ODT) 4 mg disintegrating tablet TAKE 1 TABLET BY MOUTH EVERY 8 HOURS AS NEEDED FOR NAUSEA AND VOMITING   • tretinoin (RETIN-A) 0.025 % cream    • amoxicillin (AMOXIL) 500 mg capsule TAKE 1 CAPSULE BY MOUTH TWICE A DAY FOR 10 DAYS (Patient not taking: Reported on 9/18/2023)   • sertraline (ZOLOFT) 50 mg tablet TAKE 1 TABLET BY MOUTH EVERY DAY (Patient not taking: Reported on 8/29/2023)     Allergies   Allergen Reactions   • Kiwi Extract - Food Allergy Shortness Of Breath   • [place] Flavor - Food Allergy Itching     Immunization History   Administered Date(s) Administered   • DTaP 2003, 02/19/2004, 05/19/2004, 03/15/2005, 08/22/2008   • HPV Quadrivalent 08/20/2014, 11/04/2014   • HPV9 06/18/2019   • Hep B, Adolescent or Pediatric 2003, 2003, 05/19/2004   • Hepatitis A 08/30/2012, 08/23/2013   • HiB 2003, 2003, 02/19/2004, 11/22/2004   • Hib (PRP-T) 2003, 2003, 02/19/2004, 11/22/2004   • INFLUENZA 10/12/2004, 11/22/2004, 11/01/2006, 08/22/2008, 10/02/2009, 08/20/2010, 08/22/2011, 08/30/2012, 08/23/2013, 11/04/2014, 10/02/2015, 09/27/2016, 11/05/2017, 10/09/2018, 09/17/2019, 09/29/2020, 09/24/2021, 09/13/2023   • IPV 2003, 2003, 03/15/2005, 08/22/2008   • Influenza, injectable, quadrivalent, preservative free 0.5 mL 09/17/2019, 09/29/2020, 09/24/2021, 09/13/2023   • MMR 08/20/2004, 08/30/2007   • Meningococcal MCV4, Unspecified 08/20/2014, 03/17/2020   • Meningococcal MCV4P 08/20/2014, 03/17/2020   • Pneumococcal Conjugate PCV 7 2003, 2003, 02/19/2004, 11/22/2004   • Tdap 08/20/2014   • Tuberculin Skin Test-PPD Intradermal 02/25/2022, 09/13/2023   • Varicella 08/20/2004, 08/30/2007       Objective     /84 (BP Location: Left arm, Patient Position: Sitting, Cuff Size: Standard)   Pulse 100   Temp 99.6 °F (37.6 °C) (Tympanic)   Resp 16   Ht 5' 1" (1.549 m)   Wt 69.9 kg (154 lb)   SpO2 98%   BMI 29.10 kg/m²     Physical Exam  Vitals and nursing note reviewed. Constitutional:       Appearance: She is well-developed. HENT:      Head: Normocephalic and atraumatic. Eyes:      Pupils: Pupils are equal, round, and reactive to light. Cardiovascular:      Rate and Rhythm: Normal rate and regular rhythm. Heart sounds: Normal heart sounds.    Pulmonary:      Effort: Pulmonary effort is normal.      Breath sounds: Normal breath sounds. Chest:      Chest wall: Tenderness (to palpation about the left side of her rib cage.) present. Abdominal:      General: Bowel sounds are normal.      Palpations: Abdomen is soft. Tenderness: There is abdominal tenderness in the right upper quadrant and epigastric area. Musculoskeletal:         General: Normal range of motion. Cervical back: Normal range of motion and neck supple. Lymphadenopathy:      Cervical: No cervical adenopathy. Skin:     General: Skin is warm. Neurological:      Mental Status: She is alert and oriented to person, place, and time. Cranial Nerves: No cranial nerve deficit.        Collette Mate, MD

## 2023-09-18 NOTE — ASSESSMENT & PLAN NOTE
She was given prescription for pantoprazole 40 mg 1 daily. Discussed about dietary changes. We will continue to monitor.

## 2023-09-22 ENCOUNTER — HOSPITAL ENCOUNTER (OUTPATIENT)
Dept: RADIOLOGY | Facility: IMAGING CENTER | Age: 18
Discharge: HOME | End: 2023-09-22

## 2023-09-22 DIAGNOSIS — R10.11 RIGHT UPPER QUADRANT ABDOMINAL PAIN: ICD-10-CM

## 2023-09-26 ENCOUNTER — TELEPHONE (OUTPATIENT)
Dept: FAMILY MEDICINE CLINIC | Facility: CLINIC | Age: 18
End: 2023-09-26

## 2023-09-26 NOTE — TELEPHONE ENCOUNTER
Hi, my name is Lam Barrera. I'm just calling to see if Doctor A would be able to order I left side of my abdomen for an ultrasound. I do go Friday for my right side. I was just wondering if you was able to put in an order for my left side so I would be able to get that done on Friday. I am currently experiencing pain my left side as well. My phone number is [phone]. Thank you.

## 2023-09-28 ENCOUNTER — TELEPHONE (OUTPATIENT)
Dept: FAMILY MEDICINE CLINIC | Facility: CLINIC | Age: 18
End: 2023-09-28

## 2023-09-28 DIAGNOSIS — R10.84 GENERALIZED ABDOMINAL PAIN: Primary | ICD-10-CM

## 2023-09-28 NOTE — TELEPHONE ENCOUNTER
Pt mother came in and stated she has her U/s scheduled for tomorrow but its only for the one side and she needs it for bilateral. Can we please update the order.  Thank you

## 2023-09-29 ENCOUNTER — HOSPITAL ENCOUNTER (OUTPATIENT)
Dept: RADIOLOGY | Age: 18
Discharge: HOME | End: 2023-09-29
Payer: COMMERCIAL

## 2023-09-29 PROCEDURE — 76705 ECHO EXAM OF ABDOMEN: CPT

## 2023-10-10 DIAGNOSIS — K21.9 GASTROESOPHAGEAL REFLUX DISEASE WITHOUT ESOPHAGITIS: ICD-10-CM

## 2023-10-10 RX ORDER — PANTOPRAZOLE SODIUM 40 MG/1
40 TABLET, DELAYED RELEASE ORAL
Qty: 90 TABLET | Refills: 1 | Status: SHIPPED | OUTPATIENT
Start: 2023-10-10

## 2023-10-11 DIAGNOSIS — F32.A DEPRESSION, UNSPECIFIED DEPRESSION TYPE: ICD-10-CM

## 2023-10-14 DIAGNOSIS — M94.0 COSTOCHONDRITIS: ICD-10-CM

## 2023-10-16 RX ORDER — DICLOFENAC SODIUM 75 MG/1
75 TABLET, DELAYED RELEASE ORAL 2 TIMES DAILY
Qty: 60 TABLET | Refills: 0 | Status: SHIPPED | OUTPATIENT
Start: 2023-10-16

## 2023-10-20 ENCOUNTER — OFFICE VISIT (OUTPATIENT)
Dept: FAMILY MEDICINE CLINIC | Facility: CLINIC | Age: 18
End: 2023-10-20

## 2023-10-20 VITALS
RESPIRATION RATE: 16 BRPM | SYSTOLIC BLOOD PRESSURE: 132 MMHG | TEMPERATURE: 98.9 F | DIASTOLIC BLOOD PRESSURE: 82 MMHG | BODY MASS INDEX: 30.02 KG/M2 | HEIGHT: 61 IN | OXYGEN SATURATION: 97 % | HEART RATE: 94 BPM | WEIGHT: 159 LBS

## 2023-10-20 DIAGNOSIS — J01.00 ACUTE NON-RECURRENT MAXILLARY SINUSITIS: Primary | ICD-10-CM

## 2023-10-20 DIAGNOSIS — F32.89 OTHER DEPRESSION: ICD-10-CM

## 2023-10-20 DIAGNOSIS — E55.9 VITAMIN D INSUFFICIENCY: ICD-10-CM

## 2023-10-20 DIAGNOSIS — K21.9 GASTROESOPHAGEAL REFLUX DISEASE WITHOUT ESOPHAGITIS: ICD-10-CM

## 2023-10-20 DIAGNOSIS — F41.1 GAD (GENERALIZED ANXIETY DISORDER): ICD-10-CM

## 2023-10-20 RX ORDER — AZITHROMYCIN 250 MG/1
TABLET, FILM COATED ORAL
Qty: 6 TABLET | Refills: 0 | Status: SHIPPED | OUTPATIENT
Start: 2023-10-20 | End: 2023-10-24

## 2023-10-20 RX ORDER — GUAIFENESIN AND CODEINE PHOSPHATE 100; 10 MG/5ML; MG/5ML
5 SOLUTION ORAL 3 TIMES DAILY PRN
Qty: 180 ML | Refills: 0 | Status: SHIPPED | OUTPATIENT
Start: 2023-10-20

## 2023-10-20 RX ORDER — ERGOCALCIFEROL 1.25 MG/1
50000 CAPSULE ORAL WEEKLY
Qty: 4 CAPSULE | Refills: 3 | Status: SHIPPED | OUTPATIENT
Start: 2023-10-20

## 2023-10-20 NOTE — PROGRESS NOTES
Name: Juan Alberto Kuhn      : 2005      MRN: [id number]  Encounter Provider: Massimo Cosby MD  Encounter Date: 10/20/2023   Encounter department: [facility]     1. Acute non-recurrent maxillary sinusitis  Assessment & Plan:  Patient has been experiencing cold symptoms for 7 days without improvement. Prescribed azithromycin and guaifenesin-codeine to provide aid in her recovery and symptom management. Continue symptomatic management for cold symptoms. Continue to monitor for worsening symptoms. Call or return to office if symptoms are not improving. Follow up in 6 months. Orders:  -     azithromycin (ZITHROMAX) 250 mg tablet; Take 2 tablets today then 1 tablet daily x 4 days  -     guaifenesin-codeine (GUAIFENESIN AC) 100-10 MG/5ML liquid; Take 5 mL by mouth 3 (three) times a day as needed for cough    2. Gastroesophageal reflux disease without esophagitis  Assessment & Plan:  Improved on pantoprazole. Continue same. We will continue to monitor. Orders:  -     ergocalciferol (VITAMIN D2) 50,000 units; Take 1 capsule (50,000 Units total) by mouth once a week  -     Comprehensive metabolic panel; Future    3. Vitamin D insufficiency  Assessment & Plan:  Was given prescription for vitamin D 50,000 units weekly. We will continue to monitor vitamin D. Orders:  -     ergocalciferol (VITAMIN D2) 50,000 units; Take 1 capsule (50,000 Units total) by mouth once a week  -     Vitamin D 25 hydroxy; Future  -     TSH, 3rd generation with Free T4 reflex; Future    4. Other depression  Assessment & Plan:  Improved. Sertraline was DC'd. Orders:  -     Comprehensive metabolic panel; Future  -     TSH, 3rd generation with Free T4 reflex; Future    5. REUBEN (generalized anxiety disorder)  Assessment & Plan:  Improved. Sertraline was DC'd.              Subjective     Patient is a 19year old female with a past medical history of gerd, asthma, anxiety, and depression presenting to the office today for a 1 month follow up for her gerd symptoms and management. She has no acute complaints today. She reports that she has had cold symptoms for 7 days and has been managing her symptoms with delsym over the counter without relief. She reports cough, congestion, sore throat, and headaches. She reports no other complaints or concerns and states other than her cold symptoms, her other medical problems are managed well. Depression  Associated symptoms include congestion, coughing, headaches and a sore throat. Pertinent negatives include no abdominal pain, chest pain, chills, fever, nausea or vomiting. Review of Systems   Constitutional:  Negative for chills and fever. HENT:  Positive for congestion, sinus pressure and sore throat. Negative for ear pain and sneezing. Respiratory:  Positive for cough. Negative for shortness of breath and wheezing. Cardiovascular:  Negative for chest pain, palpitations and leg swelling. Gastrointestinal:  Negative for abdominal pain, diarrhea, nausea and vomiting. Neurological:  Positive for headaches. Negative for dizziness and light-headedness. Psychiatric/Behavioral:  Positive for depression. All other systems reviewed and are negative.       Past Medical History:   Diagnosis Date   • Acne    • ADHD    • Anxiety    • Asthma    • Concussion     X 2 IN 2017   • Depression    • Fracture of lumbar spine (HCC)    • GERD (gastroesophageal reflux disease)    • Hydronephrosis    • Pertussis    • Stress fracture    • Vesicoureteral reflux      Past Surgical History:   Procedure Laterality Date   • CYST REMOVAL      Left forearm   • WISDOM TOOTH EXTRACTION       Family History   Problem Relation Age of Onset   • Hypertension Mother    • Heart murmur Mother    • Depression Mother    • Anxiety disorder Mother    • Celiac disease Father    • ADD / ADHD Father    • Cancer Paternal Grandmother         adrenal gland   • Celiac disease Paternal Grandfather    • Anxiety disorder Sister    • Depression Sister    • ADD / ADHD Sister      Social History     Socioeconomic History   • Marital status: Single     Spouse name: None   • Number of children: None   • Years of education: None   • Highest education level: None   Occupational History   • Occupation: STUDENT   Tobacco Use   • Smoking status: Never   • Smokeless tobacco: Former   Vaping Use   • Vaping Use: Former   Substance and Sexual Activity   • Alcohol use: Never   • Drug use: Never   • Sexual activity: None   Other Topics Concern   • None   Social History Narrative   • None     Social Determinants of Health     Financial Resource Strain: Not on file   Food Insecurity: Not on file   Transportation Needs: No Transportation Needs (2/5/2020)    PRAPARE - Transportation    • Lack of Transportation (Medical): No    • Lack of Transportation (Non-Medical): No   Physical Activity: Insufficiently Active (2/5/2020)    Exercise Vital Sign    • Days of Exercise per Week: 2 days    • Minutes of Exercise per Session: 20 min   Stress: No Stress Concern Present (2/5/2020)    [facility]    • Feeling of Stress :  Only a little   Social Connections: Unknown (2/5/2020)    Social Connection and Isolation Panel [NHANES]    • Frequency of Communication with Friends and Family: More than three times a week    • Frequency of Social Gatherings with Friends and Family: More than three times a week    • Attends Restoration Services: 1 to 4 times per year    • Active Member of Clubs or Organizations: No    • Attends Club or Organization Meetings: Never    • Marital Status: Not on file   Intimate Partner Violence: Not At Risk (2/5/2020)    Humiliation, Afraid, Rape, and Kick questionnaire    • Fear of Current or Ex-Partner: No    • Emotionally Abused: No    • Physically Abused: No    • Sexually Abused: No   Housing Stability: Not on file     Current Outpatient Medications on File Prior to Visit   Medication Sig   • albuterol (PROVENTIL HFA,VENTOLIN HFA) 90 mcg/act inhaler TAKE 2 PUFFS BY MOUTH EVERY 6 HOURS AS NEEDED FOR WHEEZE   • diclofenac (VOLTAREN) 75 mg EC tablet TAKE 1 TABLET BY MOUTH TWICE A DAY   • fluticasone (FLONASE) 50 mcg/act nasal spray SPRAY 1 SPRAY INTO EACH NOSTRIL EVERY DAY   • loratadine (CLARITIN) 10 mg tablet TAKE 1 TABLET BY MOUTH EVERY DAY   • medroxyPROGESTERone acetate (DEPO-PROVERA SYRINGE) 150 mg/mL injection INJECT 150 MG INTRAMUSCULAR EVERY 12 WEEKS   • nystatin (MYCOSTATIN) cream Apply 1 application topically 2 (two) times a day To affected area   • ondansetron (ZOFRAN-ODT) 4 mg disintegrating tablet TAKE 1 TABLET BY MOUTH EVERY 8 HOURS AS NEEDED FOR NAUSEA AND VOMITING   • pantoprazole (PROTONIX) 40 mg tablet TAKE 1 TABLET BY MOUTH DAILY BEFORE BREAKFAST   • tretinoin (RETIN-A) 0.025 % cream    • [DISCONTINUED] sertraline (ZOLOFT) 50 mg tablet TAKE 1 TABLET BY MOUTH EVERY DAY   • amoxicillin (AMOXIL) 500 mg capsule TAKE 1 CAPSULE BY MOUTH TWICE A DAY FOR 10 DAYS (Patient not taking: Reported on 9/18/2023)   • Glycopyrronium Tosylate 2.4 % PADS Apply topically     Allergies   Allergen Reactions   • Kiwi Extract - Food Allergy Shortness Of Breath   • James Flavor - Food Allergy Itching     Immunization History   Administered Date(s) Administered   • DTaP 2003, 02/19/2004, 05/19/2004, 03/15/2005, 08/22/2008   • HPV Quadrivalent 08/20/2014, 11/04/2014   • HPV9 06/18/2019   • Hep B, Adolescent or Pediatric 2003, 2003, 05/19/2004   • Hepatitis A 08/30/2012, 08/23/2013   • HiB 2003, 2003, 02/19/2004, 11/22/2004   • Hib (PRP-T) 2003, 2003, 02/19/2004, 11/22/2004   • INFLUENZA 10/12/2004, 11/22/2004, 11/01/2006, 08/22/2008, 10/02/2009, 08/20/2010, 08/22/2011, 08/30/2012, 08/23/2013, 11/04/2014, 10/02/2015, 09/27/2016, 11/05/2017, 10/09/2018, 09/17/2019, 09/29/2020, 09/24/2021, 09/13/2023   • IPV 2003, 2003, 03/15/2005, 08/22/2008   • Influenza, injectable, quadrivalent, preservative free 0.5 mL 09/17/2019, 09/29/2020, 09/24/2021, 09/13/2023   • MMR 08/20/2004, 08/30/2007   • Meningococcal MCV4, Unspecified 08/20/2014, 03/17/2020   • Meningococcal MCV4P 08/20/2014, 03/17/2020   • Pneumococcal Conjugate PCV 7 2003, 2003, 02/19/2004, 11/22/2004   • Tdap 08/20/2014   • Tuberculin Skin Test-PPD Intradermal 02/25/2022, 09/13/2023   • Varicella 08/20/2004, 08/30/2007       Objective     /82 (BP Location: Left arm, Patient Position: Sitting, Cuff Size: Adult)   Pulse 94   Temp 98.9 °F (37.2 °C) (Tympanic)   Resp 16   Ht 5' 1" (1.549 m)   Wt 72.1 kg (159 lb)   SpO2 97%   BMI 30.04 kg/m²     Physical Exam  Vitals and nursing note reviewed. Constitutional:       General: She is not in acute distress. Appearance: Normal appearance. She is not ill-appearing. HENT:      Head: Normocephalic and atraumatic. Right Ear: Tympanic membrane is injected. Left Ear: A middle ear effusion (mild) is present. Tympanic membrane is injected. Nose: Nose normal. No congestion or rhinorrhea. Mouth/Throat:      Mouth: Mucous membranes are moist.      Pharynx: No oropharyngeal exudate or posterior oropharyngeal erythema. Cardiovascular:      Rate and Rhythm: Normal rate and regular rhythm. Heart sounds: Normal heart sounds. No murmur heard. No friction rub. No gallop. Pulmonary:      Breath sounds: Normal breath sounds. No wheezing, rhonchi or rales. Abdominal:      Palpations: Abdomen is soft. Tenderness: There is no abdominal tenderness. There is no guarding. Musculoskeletal:      Right lower leg: No edema. Left lower leg: No edema. Neurological:      Mental Status: She is alert.        Dipak Blas MD

## 2023-10-20 NOTE — ASSESSMENT & PLAN NOTE
Patient has been experiencing cold symptoms for 7 days without improvement. Prescribed azithromycin and guaifenesin-codeine to provide aid in her recovery and symptom management. Continue symptomatic management for cold symptoms. Continue to monitor for worsening symptoms. Call or return to office if symptoms are not improving. Follow up in 6 months.

## 2023-11-12 DIAGNOSIS — K21.9 GASTROESOPHAGEAL REFLUX DISEASE WITHOUT ESOPHAGITIS: ICD-10-CM

## 2023-11-12 DIAGNOSIS — M94.0 COSTOCHONDRITIS: ICD-10-CM

## 2023-11-12 DIAGNOSIS — E55.9 VITAMIN D INSUFFICIENCY: ICD-10-CM

## 2023-11-13 RX ORDER — DICLOFENAC SODIUM 75 MG/1
75 TABLET, DELAYED RELEASE ORAL 2 TIMES DAILY
Qty: 60 TABLET | Refills: 0 | Status: SHIPPED | OUTPATIENT
Start: 2023-11-13

## 2023-11-13 RX ORDER — ERGOCALCIFEROL 1.25 MG/1
CAPSULE ORAL
Qty: 12 CAPSULE | Refills: 2 | Status: SHIPPED | OUTPATIENT
Start: 2023-11-13

## 2023-11-14 DIAGNOSIS — R21 RASH: Primary | ICD-10-CM

## 2023-11-14 RX ORDER — NYSTATIN 100000 U/G
1 CREAM TOPICAL 2 TIMES DAILY
Qty: 30 G | Refills: 0 | Status: SHIPPED | OUTPATIENT
Start: 2023-11-14

## 2023-12-06 ENCOUNTER — OFFICE VISIT (OUTPATIENT)
Dept: OBGYN CLINIC | Facility: MEDICAL CENTER | Age: 18
End: 2023-12-06
Payer: COMMERCIAL

## 2023-12-06 VITALS
SYSTOLIC BLOOD PRESSURE: 130 MMHG | HEIGHT: 61 IN | BODY MASS INDEX: 30.02 KG/M2 | WEIGHT: 159 LBS | HEART RATE: 94 BPM | DIASTOLIC BLOOD PRESSURE: 82 MMHG

## 2023-12-06 DIAGNOSIS — M43.07 SPONDYLOLYSIS, LUMBOSACRAL: Primary | ICD-10-CM

## 2023-12-06 DIAGNOSIS — M54.50 CHRONIC BILATERAL LOW BACK PAIN WITHOUT SCIATICA: ICD-10-CM

## 2023-12-06 DIAGNOSIS — M25.562 CHRONIC PAIN OF LEFT KNEE: ICD-10-CM

## 2023-12-06 DIAGNOSIS — G89.29 CHRONIC PAIN OF LEFT KNEE: ICD-10-CM

## 2023-12-06 DIAGNOSIS — M43.17 SPONDYLOLISTHESIS AT L5-S1 LEVEL: ICD-10-CM

## 2023-12-06 DIAGNOSIS — M22.2X2 PATELLOFEMORAL DISORDER OF LEFT KNEE: ICD-10-CM

## 2023-12-06 DIAGNOSIS — G89.29 CHRONIC BILATERAL LOW BACK PAIN WITHOUT SCIATICA: ICD-10-CM

## 2023-12-06 PROCEDURE — 99214 OFFICE O/P EST MOD 30 MIN: CPT | Performed by: EMERGENCY MEDICINE

## 2023-12-06 NOTE — PROGRESS NOTES
Assessment/Plan:    Diagnoses and all orders for this visit:    Spondylolysis, lumbosacral  -     MRI lumbar spine wo contrast; Future  -     Ambulatory Referral to Physical Therapy; Future    Spondylolisthesis at L5-S1 level  -     MRI lumbar spine wo contrast; Future  -     Ambulatory Referral to Physical Therapy; Future    Chronic bilateral low back pain without sciatica  -     MRI lumbar spine wo contrast; Future  -     Ambulatory Referral to Physical Therapy; Future    Chronic pain of left knee  -     MRI lumbar spine wo contrast; Future  -     Ambulatory Referral to Physical Therapy; Future    Patellofemoral disorder of left knee  -     MRI lumbar spine wo contrast; Future  -     Ambulatory Referral to Physical Therapy; Future      MRI L spine for chronic pain Xrays showing lysis and listhesis. No benefit from PT. T/C referral to Pain Management, will restart PT    Prior MRI Left knee 2022 for similar symptoms WNL. No instability on exam today. She may decide to restart PT, utilize brace, t/c referral to Sports Orthopedic surgeon    Return for Follow Up After Imaging Study. Subjective:   Patient ID: Varun Villalta is a 19 y.o. female. Bala Cook returns for 2 issues:  She notes chronic lower back pain for which she was previously seen earlier this year by out of network spine specialist x-rays were obtained showing spondylolisthesis and spondylolysis of the lumbar spine she was participating in formal physical therapy with no significant improvement. She was told that MRI L spine would be obtained if no improvement with PT. She presents here today for second opinion. Also she notes recurrence of left anterior knee pain states she was running and felt a pop of the knee she did experience swelling later that day. Previous note:  Bala Cook returns with resolution of pain of the left knee she has been participating in formal physical therapy.         Review of Systems    The following portions of the patient's chart were reviewed and updated as appropriate:    Allergy:    Allergies   Allergen Reactions    Kiwi Extract - Food Allergy Shortness Of Breath    James Flavor - Food Allergy Itching       Medications:    Current Outpatient Medications:     albuterol (PROVENTIL HFA,VENTOLIN HFA) 90 mcg/act inhaler, TAKE 2 PUFFS BY MOUTH EVERY 6 HOURS AS NEEDED FOR WHEEZE, Disp: 8.5 Inhaler, Rfl: 4    diclofenac (VOLTAREN) 75 mg EC tablet, TAKE 1 TABLET BY MOUTH TWICE A DAY, Disp: 60 tablet, Rfl: 0    ergocalciferol (VITAMIN D2) 50,000 units, TAKE 1 CAPSULE BY MOUTH ONE TIME PER WEEK, Disp: 12 capsule, Rfl: 2    fluticasone (FLONASE) 50 mcg/act nasal spray, SPRAY 1 SPRAY INTO EACH NOSTRIL EVERY DAY, Disp: 16 mL, Rfl: 1    Glycopyrronium Tosylate 2.4 % PADS, Apply topically, Disp: , Rfl:     guaifenesin-codeine (GUAIFENESIN AC) 100-10 MG/5ML liquid, Take 5 mL by mouth 3 (three) times a day as needed for cough, Disp: 180 mL, Rfl: 0    loratadine (CLARITIN) 10 mg tablet, TAKE 1 TABLET BY MOUTH EVERY DAY, Disp: 30 tablet, Rfl: 0    medroxyPROGESTERone acetate (DEPO-PROVERA SYRINGE) 150 mg/mL injection, INJECT 150 MG INTRAMUSCULAR EVERY 12 WEEKS, Disp: , Rfl: 0    nystatin (MYCOSTATIN) cream, Apply 2 g (1 Application total) topically 2 (two) times a day To affected area, Disp: 30 g, Rfl: 0    ondansetron (ZOFRAN-ODT) 4 mg disintegrating tablet, TAKE 1 TABLET BY MOUTH EVERY 8 HOURS AS NEEDED FOR NAUSEA AND VOMITING, Disp: , Rfl:     pantoprazole (PROTONIX) 40 mg tablet, TAKE 1 TABLET BY MOUTH DAILY BEFORE BREAKFAST, Disp: 90 tablet, Rfl: 1    tretinoin (RETIN-A) 0.025 % cream, , Disp: , Rfl:     amoxicillin (AMOXIL) 500 mg capsule, TAKE 1 CAPSULE BY MOUTH TWICE A DAY FOR 10 DAYS (Patient not taking: Reported on 9/18/2023), Disp: , Rfl:     Patient Active Problem List   Diagnosis    Attention deficit hyperactivity disorder (ADHD), combined type    REUBEN (generalized anxiety disorder)    Major depressive disorder, single episode, moderate degree (HCC)    Vitamin D deficiency    Exercise counseling    Nutritional counseling    Left lower quadrant pain    Generalized abdominal pain    Heart murmur    Excessive and frequent menstruation with irregular cycle    Dysmenorrhea, unspecified    Primary insomnia    Alopecia areata    Attention deficit hyperactivity disorder, inattentive type    Depression    Costochondritis    Encounter for immunization    Asthma    Suspected COVID-19 virus infection    Chest tightness    Right foot pain    Hordeolum externum of left upper eyelid    Weight gain    Vitamin D insufficiency    Influenza vaccine administered    Gastroesophageal reflux disease without esophagitis    Viral infection, unspecified    Acute pain of right knee    Sorethroat    Epigastric pain    COVID-19 virus infection    Other fatigue    Absolute anemia    Injury of left knee    Patellofemoral disorder of left knee    Acute pain of left knee    Chronic right shoulder pain    Class 1 obesity due to excess calories with serious comorbidity and body mass index (BMI) of 30.0 to 30.9 in adult    Abnormal wellness exam    Right upper quadrant abdominal pain    Acute non-recurrent maxillary sinusitis       Objective:  /82   Pulse 94   Ht 5' 1" (1.549 m)   Wt 72.1 kg (159 lb)   BMI 30.04 kg/m²     Left Knee Exam     Tenderness   The patient is experiencing tenderness in the patellar tendon and patella. Range of Motion   The patient has normal left knee ROM. Tests   Lachman:  Anterior - negative    Posterior - negative  Patellar apprehension: negative    Other   Erythema: absent  Sensation: normal            Physical Exam      Neurologic Exam    Procedures    I have personally reviewed the written report of the pertinent studies. 7/2023  XR SPINE LUMBAR MINIMUM 4 VIEWS NON INJURY  Order: [id number]  Impression    IMPRESSION:    Grade 1 anterolisthesis of L5-S1. Likely L5 pars defects.   Radiographic examination of lumbar spine including 2 oblique views or CT  examination of lumbar spine are recommended for further evaluation. No significant abnormal motion. Past Medical History:   Diagnosis Date    Acne     ADHD     Anxiety     Asthma     Concussion     X 2 IN 2017    Depression     Fracture of lumbar spine (HCC)     GERD (gastroesophageal reflux disease)     Hydronephrosis     Pertussis     Stress fracture     Vesicoureteral reflux        Past Surgical History:   Procedure Laterality Date    CYST REMOVAL      Left forearm    WISDOM TOOTH EXTRACTION         Social History     Socioeconomic History    Marital status: Single     Spouse name: Not on file    Number of children: Not on file    Years of education: Not on file    Highest education level: Not on file   Occupational History    Occupation: STUDENT   Tobacco Use    Smoking status: Never    Smokeless tobacco: Former   Vaping Use    Vaping Use: Former   Substance and Sexual Activity    Alcohol use: Never    Drug use: Never    Sexual activity: Not on file   Other Topics Concern    Not on file   Social History Narrative    Not on file     Social Determinants of Health     Financial Resource Strain: Not on file   Food Insecurity: Not on file   Transportation Needs: No Transportation Needs (2/5/2020)    PRAPARE - Transportation     Lack of Transportation (Medical): No     Lack of Transportation (Non-Medical): No   Physical Activity: Insufficiently Active (2/5/2020)    Exercise Vital Sign     Days of Exercise per Week: 2 days     Minutes of Exercise per Session: 20 min   Stress: No Stress Concern Present (2/5/2020)    [facility]     Feeling of Stress :  Only a little   Social Connections: Unknown (2/5/2020)    Social Connection and Isolation Panel [NHANES]     Frequency of Communication with Friends and Family: More than three times a week     Frequency of Social Gatherings with Friends and Family: More than three times a week     Attends Church Services: 1 to 4 times per year     Active Member of Clubs or Organizations: No     Attends Club or Organization Meetings: Never     Marital Status: Not on file   Intimate Partner Violence: Not At Risk (2/5/2020)    Humiliation, Afraid, Rape, and Kick questionnaire     Fear of Current or Ex-Partner: No     Emotionally Abused: No     Physically Abused: No     Sexually Abused: No   Housing Stability: Not on file       Family History   Problem Relation Age of Onset    Hypertension Mother     Heart murmur Mother     Depression Mother     Anxiety disorder Mother     Celiac disease Father     ADD / ADHD Father     Cancer Paternal Grandmother         adrenal gland    Celiac disease Paternal Grandfather     Anxiety disorder Sister     Depression Sister     ADD / ADHD Sister

## 2023-12-19 ENCOUNTER — EVALUATION (OUTPATIENT)
Dept: PHYSICAL THERAPY | Age: 18
End: 2023-12-19
Payer: COMMERCIAL

## 2023-12-19 DIAGNOSIS — M43.17 SPONDYLOLISTHESIS AT L5-S1 LEVEL: ICD-10-CM

## 2023-12-19 DIAGNOSIS — M22.2X2 PATELLOFEMORAL DISORDER OF LEFT KNEE: ICD-10-CM

## 2023-12-19 DIAGNOSIS — M25.562 CHRONIC PAIN OF LEFT KNEE: ICD-10-CM

## 2023-12-19 DIAGNOSIS — G89.29 CHRONIC PAIN OF LEFT KNEE: ICD-10-CM

## 2023-12-19 DIAGNOSIS — M43.07 SPONDYLOLYSIS, LUMBOSACRAL: ICD-10-CM

## 2023-12-19 DIAGNOSIS — M54.50 CHRONIC BILATERAL LOW BACK PAIN WITHOUT SCIATICA: ICD-10-CM

## 2023-12-19 DIAGNOSIS — G89.29 CHRONIC BILATERAL LOW BACK PAIN WITHOUT SCIATICA: ICD-10-CM

## 2023-12-19 PROBLEM — J01.00 ACUTE NON-RECURRENT MAXILLARY SINUSITIS: Status: RESOLVED | Noted: 2023-10-20 | Resolved: 2023-12-19

## 2023-12-19 PROCEDURE — 97162 PT EVAL MOD COMPLEX 30 MIN: CPT

## 2023-12-19 PROCEDURE — 97110 THERAPEUTIC EXERCISES: CPT

## 2023-12-19 NOTE — PROGRESS NOTES
PT Evaluation     Today's date: 2023  Patient name: Richard Markham  : 2005  MRN: [id number]  Referring provider: Champ Hess MD  Dx:   Encounter Diagnosis     ICD-10-CM    1. Spondylolysis, lumbosacral  M43.07 Ambulatory Referral to Physical Therapy      2. Spondylolisthesis at L5-S1 level  M43.17 Ambulatory Referral to Physical Therapy      3. Chronic bilateral low back pain without sciatica  M54.50 Ambulatory Referral to Physical Therapy    G89.29       4. Chronic pain of left knee  M25.562 Ambulatory Referral to Physical Therapy    G89.29       5. Patellofemoral disorder of left knee  M22.2X2 Ambulatory Referral to Physical Therapy                     Assessment  Assessment details: Richard Markham is a 18 y.o. female presenting to outpatient physical therapy with chief complaints of right sided LBP/SI joint pain. Patient presents with increased pain, decreased lumbar ROM, decreased flexibility, decreased strength, abnormal pelvic alignment, decreased activity tolerance to sitting, bending and lifting activities, poor posture and postural awareness.  Patient's signs and symptoms are consistent with Spondylolysis, lumbosacral  Spondylolisthesis at L5-S1 level  Chronic bilateral low back pain without sciatica  Chronic pain of left knee and SI Joint dysfunction.    Patellofemoral disorder of left knee  resulting in limitations in her ability to sit, stand, walk and participate in recreational activities.   Patient would benefit from skilled PT services in order to address her impairments and improve ability to resume normal activities without pain.      Thank you for the opportunity to share in the care of this patient.      Impairments: abnormal or restricted ROM, activity intolerance, impaired physical strength, lacks appropriate home exercise program, pain with function and poor posture   Understanding of Dx/Px/POC: good   Prognosis: good    Goals  STG to be met in 4 weeks:  - Increase Lumbar AROM:  WNL without pain  - Increase Bilateral LE strength by 1/2 MMT grade.  - I with HEP.  - Normalize pelvic alignment with decreased right SI joint pain    LTG to be met in 8 weeks:  - Resolve right SI joint pain.  - Increase Bilateral LE strength to /5 5all planes.  - I with updated HEP.  - Patient will be able to resume sitting, lifting and standing activities without pain or symptoms.    -Improve FOTO score to >= projected outcome.       Plan  Patient would benefit from: skilled physical therapy  Planned modality interventions: cryotherapy and thermotherapy: hydrocollator packs  Planned therapy interventions: activity modification, joint mobilization, manual therapy, neuromuscular re-education, patient education, postural training, strengthening, stretching, therapeutic exercise, therapeutic activities, functional ROM exercises, home exercise program, gait training, body mechanics training, abdominal trunk stabilization and flexibility  Frequency: 2x month  Duration in weeks: 8  Plan of Care expiration date: 2/9/2024  Treatment plan discussed with: patient    Subjective Evaluation    History of Present Illness  Mechanism of injury: At Evaluation (December 19, 2023): Patient is a 18  y.o. female     referred for outpatient PT services with complaints of low back pain and left knee pain   She notes chronic lower back pain for which she was previously seen earlier this year by out of network spine specialist x-rays were obtained showing spondylolisthesis and spondylolysis of the lumbar spine she was participating in formal physical therapy in August 2023 with no significant improvement.  She was told that MRI L spine would be obtained if no improvement with PT.  She presents here today for second opinion.    Also she notes recurrence of left anterior knee pain states she was running and felt a pop of the knee she did experience swelling later that day.    Patient is scheduled to an MRI tomorrow.      Patient reports she  had an injury to her back about 6 years playing basketball with 2 stress fractures in her spine.  Patient has been having issues on and off for the past 6 years.  Patient reports numbness and tingling down both legs mostly in the right leg when laying down at night.  Patient reports a sharp pain in her low back area.  Has difficulty with forward bending and returning to standing.      Patient Goals: I don't want to be in pain.      Quality of life: fair    Patient Goals  Patient goals for therapy: decreased pain    Pain  Current pain ratin  At best pain ratin  At worst pain rating: 10  Location: right SI/lower lumbar area  Quality: sharp  Aggravating factors: lifting, walking and standing  Progression: worsening    Social Support  Lives with: parents    Employment status: working  Treatments  Previous treatment: physical therapy  Current treatment: physical therapy    Objective     Postural Observations  Seated posture: fair  Standing posture: fair    Additional Postural Observation Details  Forward head rounded shoulder posture.    Slumped sitting posture with posterior pelvic tilt.      Tenderness     Right Hip   Tenderness in the PSIS.     Neurological Testing     Sensation     Lumbar   Left   Intact: light touch    Right   Intact: light touch    Active Range of Motion     Lumbar   Flexion:  with pain Restriction level: minimal  Extension:  WFL  Left lateral flexion:  Restriction level: minimal  Right lateral flexion:  with pain Restriction level: minimal  Left rotation:  WFL  Right rotation:  WFL  Mechanical Assessment    Cervical      Thoracic      Lumbar    Sitting flexion: repeated movements  Pain location: centralized  Pain intensity: worse  Standing extension: repeated movements  Pain location: centralized  Lying extension: repeated movements  Pain location: no change    Strength/Myotome Testing     Left Hip   Planes of Motion   Flexion: 4+  Extension: 4+  Abduction: 4+    Right Hip   Planes of  Motion   Flexion: 4  Extension: 4  Abduction: 4    Left Knee   Flexion: 4+  Extension: 4    Right Knee   Flexion: 4  Extension: 4    Left Ankle/Foot   Dorsiflexion: 5  Plantar flexion: 5    Right Ankle/Foot   Dorsiflexion: 5  Plantar flexion: 5    Tests     Lumbar   Positive SIJ compression and sacral spring .     Left   Negative passive SLR and slump test.     Right   Negative passive SLR and slump test.     Left Pelvic Girdle/Sacrum   Negative: active SLR test.     Right Pelvic Girdle/Sacrum   Negative: active SLR test.     Left Hip   Negative ELISABET.     Right Hip   Negative ELISABET.     General Comments:      Lumbar Comments  Right ASIS is anterior rotated with tenderness in right SI joint sulcus.     Hip Comments   Moderate bilateral piriformis and hamstring tightness           Precautions: GERD, PF disorder Left knee, ADHD, anxiety, depression    Access Code: [email or web address]  URL: [email or web address]  Date: 12/19/2023  Prepared by: Elaina Cha    Exercises  - Supine Piriformis Stretch Pulling Heel to Hip  - 1 x daily - 7 x weekly - 1 sets - 10 reps - 10 s hold  - Supine Hamstring Stretch  - 1 x daily - 7 x weekly - 1 sets - 10 reps - 10 s hold  - Supine Lower Trunk Rotation  - 1 x daily - 7 x weekly - 3 sets - 10 reps - 5 s hold  - Supine ITB Stretch  - 1 x daily - 7 x weekly - 10 reps - 10 s hold        Manuals             ME for right anterior ASIS                                                    Neuro Re-Ed             TvA activation             TvA with march             TvA with bridge             TvA with hooklying knee drop             Pall of press             SL bridge             Multifidi lift             Ther Ex             Clam shells             Hip fire hydrants             Open book stretch             Hamstring stretch             Piriformis stretch             Supine sciatic nerve glides             LTR             Seated thoracic ext             ITB stretch supine             TFL  stretch supine             Butterfly stretch                          Ther Activity                                       Gait Training                                       Modalities

## 2023-12-20 ENCOUNTER — HOSPITAL ENCOUNTER (OUTPATIENT)
Dept: RADIOLOGY | Facility: IMAGING CENTER | Age: 18
Discharge: HOME | End: 2023-12-20
Payer: COMMERCIAL

## 2023-12-20 DIAGNOSIS — G89.29 CHRONIC PAIN OF LEFT KNEE: ICD-10-CM

## 2023-12-20 DIAGNOSIS — M22.2X2 PATELLOFEMORAL DISORDER OF LEFT KNEE: ICD-10-CM

## 2023-12-20 DIAGNOSIS — M43.07 SPONDYLOLYSIS, LUMBOSACRAL: ICD-10-CM

## 2023-12-20 DIAGNOSIS — M43.17 SPONDYLOLISTHESIS AT L5-S1 LEVEL: ICD-10-CM

## 2023-12-20 DIAGNOSIS — M25.562 CHRONIC PAIN OF LEFT KNEE: ICD-10-CM

## 2023-12-20 DIAGNOSIS — G89.29 CHRONIC BILATERAL LOW BACK PAIN WITHOUT SCIATICA: ICD-10-CM

## 2023-12-20 DIAGNOSIS — M54.50 CHRONIC BILATERAL LOW BACK PAIN WITHOUT SCIATICA: ICD-10-CM

## 2023-12-20 PROCEDURE — 72148 MRI LUMBAR SPINE W/O DYE: CPT

## 2023-12-20 PROCEDURE — G1004 CDSM NDSC: HCPCS

## 2023-12-29 ENCOUNTER — OFFICE VISIT (OUTPATIENT)
Dept: OBGYN CLINIC | Facility: MEDICAL CENTER | Age: 18
End: 2023-12-29
Payer: COMMERCIAL

## 2023-12-29 VITALS — WEIGHT: 159 LBS | BODY MASS INDEX: 30.02 KG/M2 | HEIGHT: 61 IN

## 2023-12-29 DIAGNOSIS — M25.562 CHRONIC PAIN OF LEFT KNEE: ICD-10-CM

## 2023-12-29 DIAGNOSIS — M22.2X2 PATELLOFEMORAL DISORDER OF LEFT KNEE: ICD-10-CM

## 2023-12-29 DIAGNOSIS — G89.29 CHRONIC BILATERAL LOW BACK PAIN WITHOUT SCIATICA: Primary | ICD-10-CM

## 2023-12-29 DIAGNOSIS — M43.07 SPONDYLOLYSIS, LUMBOSACRAL: ICD-10-CM

## 2023-12-29 DIAGNOSIS — M54.50 CHRONIC BILATERAL LOW BACK PAIN WITHOUT SCIATICA: Primary | ICD-10-CM

## 2023-12-29 DIAGNOSIS — M43.17 SPONDYLOLISTHESIS AT L5-S1 LEVEL: ICD-10-CM

## 2023-12-29 DIAGNOSIS — G89.29 CHRONIC PAIN OF LEFT KNEE: ICD-10-CM

## 2023-12-29 PROCEDURE — 99213 OFFICE O/P EST LOW 20 MIN: CPT | Performed by: EMERGENCY MEDICINE

## 2023-12-29 NOTE — PROGRESS NOTES
Assessment/Plan:    Diagnoses and all orders for this visit:    Chronic bilateral low back pain without sciatica    Spondylolysis, lumbosacral    Spondylolisthesis at L5-S1 level    Chronic pain of left knee    Patellofemoral disorder of left knee      Scheduled to see orthopedic surgeon for chronic left knee pain and mechanical symptoms she has participated in 2 sessions of formal physical therapy in the past as well as utilizing a knee brace    Patient has started formal physical therapy for her lower back they were going to be focusing on the right SI joint if no improvement to consider referral to pain management    Return in about 6 weeks (around 2/9/2024) for (lower back).      Subjective:   Patient ID: Richard Markham is a 18 y.o. female.    Patient returns to review MRI of the lumbar spine  She also continues with chronic left knee pain symptoms she is scheduled to see the orthopedics surgeon        Review of Systems    The following portions of the patient's chart were reviewed and updated as appropriate:   Allergy:    Allergies   Allergen Reactions    Kiwi Extract - Food Allergy Shortness Of Breath    James Flavor - Food Allergy Itching       Medications:    Current Outpatient Medications:     albuterol (PROVENTIL HFA,VENTOLIN HFA) 90 mcg/act inhaler, TAKE 2 PUFFS BY MOUTH EVERY 6 HOURS AS NEEDED FOR WHEEZE, Disp: 8.5 Inhaler, Rfl: 4    diclofenac (VOLTAREN) 75 mg EC tablet, TAKE 1 TABLET BY MOUTH TWICE A DAY, Disp: 60 tablet, Rfl: 0    ergocalciferol (VITAMIN D2) 50,000 units, TAKE 1 CAPSULE BY MOUTH ONE TIME PER WEEK, Disp: 12 capsule, Rfl: 2    fluticasone (FLONASE) 50 mcg/act nasal spray, SPRAY 1 SPRAY INTO EACH NOSTRIL EVERY DAY, Disp: 16 mL, Rfl: 1    Glycopyrronium Tosylate 2.4 % PADS, Apply topically, Disp: , Rfl:     guaifenesin-codeine (GUAIFENESIN AC) 100-10 MG/5ML liquid, Take 5 mL by mouth 3 (three) times a day as needed for cough, Disp: 180 mL, Rfl: 0    loratadine (CLARITIN) 10 mg tablet,  TAKE 1 TABLET BY MOUTH EVERY DAY, Disp: 30 tablet, Rfl: 0    medroxyPROGESTERone acetate (DEPO-PROVERA SYRINGE) 150 mg/mL injection, INJECT 150 MG INTRAMUSCULAR EVERY 12 WEEKS, Disp: , Rfl: 0    nystatin (MYCOSTATIN) cream, Apply 2 g (1 Application total) topically 2 (two) times a day To affected area, Disp: 30 g, Rfl: 0    ondansetron (ZOFRAN-ODT) 4 mg disintegrating tablet, TAKE 1 TABLET BY MOUTH EVERY 8 HOURS AS NEEDED FOR NAUSEA AND VOMITING, Disp: , Rfl:     pantoprazole (PROTONIX) 40 mg tablet, TAKE 1 TABLET BY MOUTH DAILY BEFORE BREAKFAST, Disp: 90 tablet, Rfl: 1    tretinoin (RETIN-A) 0.025 % cream, , Disp: , Rfl:     amoxicillin (AMOXIL) 500 mg capsule, TAKE 1 CAPSULE BY MOUTH TWICE A DAY FOR 10 DAYS (Patient not taking: Reported on 9/18/2023), Disp: , Rfl:     Patient Active Problem List   Diagnosis    Attention deficit hyperactivity disorder (ADHD), combined type    REUBEN (generalized anxiety disorder)    Major depressive disorder, single episode, moderate degree (HCC)    Vitamin D deficiency    Exercise counseling    Nutritional counseling    Left lower quadrant pain    Generalized abdominal pain    Heart murmur    Excessive and frequent menstruation with irregular cycle    Dysmenorrhea, unspecified    Primary insomnia    Alopecia areata    Attention deficit hyperactivity disorder, inattentive type    Depression    Costochondritis    Encounter for immunization    Asthma    Suspected COVID-19 virus infection    Chest tightness    Right foot pain    Hordeolum externum of left upper eyelid    Weight gain    Vitamin D insufficiency    Influenza vaccine administered    Gastroesophageal reflux disease without esophagitis    Viral infection, unspecified    Acute pain of right knee    Sorethroat    Epigastric pain    COVID-19 virus infection    Other fatigue    Absolute anemia    Injury of left knee    Patellofemoral disorder of left knee    Acute pain of left knee    Chronic right shoulder pain    Class 1 obesity  "due to excess calories with serious comorbidity and body mass index (BMI) of 30.0 to 30.9 in adult    Abnormal wellness exam    Right upper quadrant abdominal pain       Objective:  Ht 5' 1\" (1.549 m)   Wt 72.1 kg (159 lb)   BMI 30.04 kg/m²     Ortho Exam    Physical Exam      Neurologic Exam    Procedures    I have personally reviewed pertinent films in PACS. and I have personally reviewed the written report of the pertinent studies.   MRI lumbar spine within normal limits      XR SPINE LUMBAR MINIMUM 4 VIEWS NON INJURY  Order: [id number]  Impression    IMPRESSION:    Grade 1 anterolisthesis of L5-S1.  Likely L5 pars defects.  Radiographic examination of lumbar spine including 2 oblique views or CT  examination of lumbar spine are recommended for further evaluation.             Past Medical History:   Diagnosis Date    Acne     ADHD     Anxiety     Asthma     Concussion     X 2 IN 2017    Depression     Fracture of lumbar spine (HCC)     GERD (gastroesophageal reflux disease)     Hydronephrosis     Pertussis     Stress fracture     Vesicoureteral reflux        Past Surgical History:   Procedure Laterality Date    CYST REMOVAL      Left forearm    WISDOM TOOTH EXTRACTION         Social History     Socioeconomic History    Marital status: Single     Spouse name: Not on file    Number of children: Not on file    Years of education: Not on file    Highest education level: Not on file   Occupational History    Occupation: STUDENT   Tobacco Use    Smoking status: Never    Smokeless tobacco: Former   Vaping Use    Vaping status: Former   Substance and Sexual Activity    Alcohol use: Never    Drug use: Never    Sexual activity: Not on file   Other Topics Concern    Not on file   Social History Narrative    Not on file     Social Determinants of Health     Financial Resource Strain: Not on file   Food Insecurity: Not on file   Transportation Needs: No Transportation Needs (2/5/2020)    PRAPARE - Transportation     Lack of " Transportation (Medical): No     Lack of Transportation (Non-Medical): No   Physical Activity: Insufficiently Active (2/5/2020)    Exercise Vital Sign     Days of Exercise per Week: 2 days     Minutes of Exercise per Session: 20 min   Stress: No Stress Concern Present (2/5/2020)    [employer] [employer] - Occupational Stress Questionnaire     Feeling of Stress : Only a little   Social Connections: Unknown (2/5/2020)    Social Connection and Isolation Panel [NHANES]     Frequency of Communication with Friends and Family: More than three times a week     Frequency of Social Gatherings with Friends and Family: More than three times a week     Attends Yarsanism Services: 1 to 4 times per year     Active Member of Clubs or Organizations: No     Attends Club or Organization Meetings: Never     Marital Status: Not on file   Intimate Partner Violence: Not At Risk (2/5/2020)    Humiliation, Afraid, Rape, and Kick questionnaire     Fear of Current or Ex-Partner: No     Emotionally Abused: No     Physically Abused: No     Sexually Abused: No   Housing Stability: Not on file       Family History   Problem Relation Age of Onset    Hypertension Mother     Heart murmur Mother     Depression Mother     Anxiety disorder Mother     Celiac disease Father     ADD / ADHD Father     Cancer Paternal Grandmother         adrenal gland    Celiac disease Paternal Grandfather     Anxiety disorder Sister     Depression Sister     ADD / ADHD Sister

## 2024-01-05 ENCOUNTER — OFFICE VISIT (OUTPATIENT)
Dept: OBGYN CLINIC | Facility: MEDICAL CENTER | Age: 19
End: 2024-01-05
Payer: COMMERCIAL

## 2024-01-05 VITALS
HEART RATE: 90 BPM | BODY MASS INDEX: 30.78 KG/M2 | SYSTOLIC BLOOD PRESSURE: 144 MMHG | DIASTOLIC BLOOD PRESSURE: 88 MMHG | WEIGHT: 163 LBS | HEIGHT: 61 IN

## 2024-01-05 DIAGNOSIS — M22.2X2 PATELLOFEMORAL DISORDER OF LEFT KNEE: Primary | ICD-10-CM

## 2024-01-05 PROCEDURE — 99214 OFFICE O/P EST MOD 30 MIN: CPT | Performed by: ORTHOPAEDIC SURGERY

## 2024-01-05 NOTE — PROGRESS NOTES
Ortho Sports Medicine Knee New Patient Visit     Assesment:   18 y.o. female left knee patellofemoral syndrome, with low back pain.       Plan:    Conservative treatment:    Ice to knee for 20 minutes at least 1-2 times daily.  PT for ROM/strengthening to knee, hip and core. PT script provided.  4 point hip strengthening exercises demonstrated in office and instructional sheet provided.  OTC NSAIDs for pain PRN.  Re-assess in 6-8 weeks. May consider repeat MRI of the left knee if no improvement in her symptoms.      Imaging:    All imaging from today was reviewed by myself and explained to the patient.       Injection:    No Injection planned at this time.      Surgery:     No surgery is recommended at this point, continue with conservative treatment plan as noted.      Follow up:    Return in about 6 weeks (around 2/16/2024) for Recheck with Dr. Coleman.        Chief Complaint   Patient presents with    Left Knee - Pain       History of Present Illness:    The patient is a 18 y.o. female whose occupation is unknown, referred to me by Dr. Hess, seen in clinic for consultation of left knee pain.      Pain is located anterior, medial.  The patient rates the pain as a 7/10.  The pain has been present for 2 years.      The patient sustained an injury 2 years ago with a fall onto the bleachers with a twist and pop in her ipsilateral knee. The patient re-injured her knee with a fall, twist, and pop while running to catch an item at work in November 2023. The pain is characterized as dull, achy.  The pain is present at all times.      Pain is improved by rest. Pain is aggravated by stairs, squatting, and walking.    Symptoms include clicking and intermittent swelling that improves as the day goes on.     The patient has tried rest, knee brace and physical therapy.        The patient reports a history of lumbar spondylolysis and spondylolisthesis at the L5-S1 level. The patient reports distal paresthesias into her left  lower leg and foot but denies paresthesias or pain in her lateral thigh.      Knee Surgical History:  None    Past Medical, Social and Family History:  Past Medical History:   Diagnosis Date    Acne     ADHD     Anxiety     Asthma     Concussion     X 2 IN 2017    Depression     Fracture of lumbar spine (HCC)     GERD (gastroesophageal reflux disease)     Hydronephrosis     Pertussis     Stress fracture     Vesicoureteral reflux      Past Surgical History:   Procedure Laterality Date    CYST REMOVAL      Left forearm    WISDOM TOOTH EXTRACTION       Allergies   Allergen Reactions    Kiwi Extract - Food Allergy Shortness Of Breath    James Flavor - Food Allergy Itching     Current Outpatient Medications on File Prior to Visit   Medication Sig Dispense Refill    albuterol (PROVENTIL HFA,VENTOLIN HFA) 90 mcg/act inhaler TAKE 2 PUFFS BY MOUTH EVERY 6 HOURS AS NEEDED FOR WHEEZE 8.5 Inhaler 4    diclofenac (VOLTAREN) 75 mg EC tablet TAKE 1 TABLET BY MOUTH TWICE A DAY 60 tablet 0    ergocalciferol (VITAMIN D2) 50,000 units TAKE 1 CAPSULE BY MOUTH ONE TIME PER WEEK 12 capsule 2    fluticasone (FLONASE) 50 mcg/act nasal spray SPRAY 1 SPRAY INTO EACH NOSTRIL EVERY DAY 16 mL 1    Glycopyrronium Tosylate 2.4 % PADS Apply topically      guaifenesin-codeine (GUAIFENESIN AC) 100-10 MG/5ML liquid Take 5 mL by mouth 3 (three) times a day as needed for cough 180 mL 0    loratadine (CLARITIN) 10 mg tablet TAKE 1 TABLET BY MOUTH EVERY DAY 30 tablet 0    medroxyPROGESTERone acetate (DEPO-PROVERA SYRINGE) 150 mg/mL injection INJECT 150 MG INTRAMUSCULAR EVERY 12 WEEKS  0    nystatin (MYCOSTATIN) cream Apply 2 g (1 Application total) topically 2 (two) times a day To affected area 30 g 0    ondansetron (ZOFRAN-ODT) 4 mg disintegrating tablet TAKE 1 TABLET BY MOUTH EVERY 8 HOURS AS NEEDED FOR NAUSEA AND VOMITING      pantoprazole (PROTONIX) 40 mg tablet TAKE 1 TABLET BY MOUTH DAILY BEFORE BREAKFAST 90 tablet 1    tretinoin (RETIN-A) 0.025 %  cream       amoxicillin (AMOXIL) 500 mg capsule TAKE 1 CAPSULE BY MOUTH TWICE A DAY FOR 10 DAYS (Patient not taking: Reported on 9/18/2023)       No current facility-administered medications on file prior to visit.     Social History     Socioeconomic History    Marital status: Single     Spouse name: Not on file    Number of children: Not on file    Years of education: Not on file    Highest education level: Not on file   Occupational History    Occupation: STUDENT   Tobacco Use    Smoking status: Never    Smokeless tobacco: Former   Vaping Use    Vaping status: Former   Substance and Sexual Activity    Alcohol use: Never    Drug use: Never    Sexual activity: Not on file   Other Topics Concern    Not on file   Social History Narrative    Not on file     Social Determinants of Health     Financial Resource Strain: Not on file   Food Insecurity: Not on file   Transportation Needs: No Transportation Needs (2/5/2020)    PRAPARE - Transportation     Lack of Transportation (Medical): No     Lack of Transportation (Non-Medical): No   Physical Activity: Insufficiently Active (2/5/2020)    Exercise Vital Sign     Days of Exercise per Week: 2 days     Minutes of Exercise per Session: 20 min   Stress: No Stress Concern Present (2/5/2020)    [employer] - Occupational Stress Questionnaire     Feeling of Stress : Only a little   Social Connections: Unknown (2/5/2020)    Social Connection and Isolation Panel [NHANES]     Frequency of Communication with Friends and Family: More than three times a week     Frequency of Social Gatherings with Friends and Family: More than three times a week     Attends Latter-day Services: 1 to 4 times per year     Active Member of Clubs or Organizations: No     Attends Club or Organization Meetings: Never     Marital Status: Not on file   Intimate Partner Violence: Not At Risk (2/5/2020)    Humiliation, Afraid, Rape, and Kick questionnaire     Fear of Current or  "Ex-Partner: No     Emotionally Abused: No     Physically Abused: No     Sexually Abused: No   Housing Stability: Not on file         I have reviewed the past medical, surgical, social and family history, medications and allergies as documented in the EMR.    Review of systems: ROS is negative other than that noted in the HPI.  Constitutional: Negative for fatigue and fever.   HENT: Negative for sore throat.    Respiratory: Negative for shortness of breath.    Cardiovascular: Negative for chest pain.   Gastrointestinal: Negative for abdominal pain.   Endocrine: Negative for cold intolerance and heat intolerance.   Genitourinary: Negative for flank pain.   Musculoskeletal: Negative for back pain.   Skin: Negative for rash.   Allergic/Immunologic: Negative for immunocompromised state.   Neurological: Negative for dizziness.   Psychiatric/Behavioral: Negative for agitation.      Physical Exam:    Blood pressure 144/88, pulse 90, height 5' 1\" (1.549 m), weight 73.9 kg (163 lb).    General/Constitutional: NAD, well developed, well nourished  HENT: Normocephalic, atraumatic  CV: Intact distal pulses, regular rate  Resp: No respiratory distress or labored breathing  GI: Soft and non-tender   Lymphatic: No lymphadenopathy palpated  Neuro: Alert and Oriented x 3, no focal deficits  Psych: Normal mood, normal affect, normal judgement, normal behavior  Skin: Warm, dry, no rashes, no erythema      Knee Exam (focused):                RIGHT LEFT   ROM:   0-130 0-130   Palpation: Effusion negative negative     MJL tenderness Negative Positive. Medial knee pain worse than lateral knee pain     LJL tenderness Negative Positive   Meniscus: Solange Negative Negative    Apley's Compression Negative Negative   Instability: Varus stable stable     Valgus stable stable   Special Tests: Lachman Negative Negative     Posterior drawer Negative Negative     Anterior drawer Negative Negative     Pivot shift not tested not tested     Dial not " tested not tested   Patella: Palpation no tenderness Medial facet and inferior pole     Mobility 1/4 1/4     Apprehension Negative Negative   Other: Single leg 1/4 squat good stability Weakness    ---    Tight quads and hamstrings on exam      LE NV Exam: +2 DP/PT pulses bilaterally  Sensation intact to light touch L2-S1 bilaterally     Bilateral hip ROM demonstrates no pain actively or passively    No calf tenderness to palpation bilaterally    Knee Imaging    X-rays of the left knee from 02/20/2023 were reviewed, which demonstrate no acute fracture or dislocation.  I have reviewed the radiology report and agree with their impression.    MRI of the left knee from 03/23/2023 were reviewed, which demonstrate no evidence of meniscal tear or internal derangement.  I have reviewed the radiology report and agree with their impression.      Scribe Attestation      I,:  Luna Vaughn am acting as a scribe while in the presence of the attending physician.:       I,:  Juanpablo Coleman, DO personally performed the services described in this documentation    as scribed in my presence.:

## 2024-01-23 DIAGNOSIS — R11.0 NAUSEA: Primary | ICD-10-CM

## 2024-01-23 RX ORDER — ONDANSETRON 4 MG/1
4 TABLET, ORALLY DISINTEGRATING ORAL EVERY 6 HOURS PRN
Qty: 20 TABLET | Refills: 0 | Status: SHIPPED | OUTPATIENT
Start: 2024-01-23

## 2024-03-09 ENCOUNTER — APPOINTMENT (EMERGENCY)
Dept: CT IMAGING | Facility: HOSPITAL | Age: 19
End: 2024-03-09
Payer: COMMERCIAL

## 2024-03-09 ENCOUNTER — HOSPITAL ENCOUNTER (EMERGENCY)
Facility: HOSPITAL | Age: 19
Discharge: HOME | End: 2024-03-09
Attending: EMERGENCY MEDICINE | Admitting: EMERGENCY MEDICINE
Payer: COMMERCIAL

## 2024-03-09 VITALS
TEMPERATURE: 98.8 F | DIASTOLIC BLOOD PRESSURE: 76 MMHG | OXYGEN SATURATION: 100 % | HEART RATE: 104 BPM | RESPIRATION RATE: 16 BRPM | SYSTOLIC BLOOD PRESSURE: 117 MMHG | WEIGHT: 164.24 LBS | BODY MASS INDEX: 31.01 KG/M2 | HEIGHT: 61 IN

## 2024-03-09 DIAGNOSIS — R10.9 ABDOMINAL PAIN: Primary | ICD-10-CM

## 2024-03-09 LAB
ALBUMIN SERPL BCP-MCNC: 4.6 G/DL (ref 3.5–5)
ALP SERPL-CCNC: 76 U/L (ref 34–104)
ALT SERPL W P-5'-P-CCNC: 23 U/L (ref 7–52)
ANION GAP SERPL CALCULATED.3IONS-SCNC: 7 MMOL/L
AST SERPL W P-5'-P-CCNC: 17 U/L (ref 13–39)
BACTERIA UR QL AUTO: NORMAL /HPF
BASOPHILS # BLD AUTO: 0.04 THOUSANDS/ÂΜL (ref 0–0.1)
BASOPHILS NFR BLD AUTO: 1 % (ref 0–1)
BILIRUB SERPL-MCNC: 0.28 MG/DL (ref 0.2–1)
BILIRUB UR QL STRIP: NEGATIVE
BUN SERPL-MCNC: 8 MG/DL (ref 5–25)
CALCIUM SERPL-MCNC: 9.4 MG/DL (ref 8.4–10.2)
CHLORIDE SERPL-SCNC: 106 MMOL/L (ref 96–108)
CLARITY UR: CLEAR
CO2 SERPL-SCNC: 25 MMOL/L (ref 21–32)
COLOR UR: COLORLESS
CREAT SERPL-MCNC: 0.71 MG/DL (ref 0.6–1.3)
EOSINOPHIL # BLD AUTO: 0.2 THOUSAND/ÂΜL (ref 0–0.61)
EOSINOPHIL NFR BLD AUTO: 4 % (ref 0–6)
ERYTHROCYTE [DISTWIDTH] IN BLOOD BY AUTOMATED COUNT: 12.6 % (ref 11.6–15.1)
EXT PREGNANCY TEST URINE: NEGATIVE
EXT. CONTROL: NORMAL
GFR SERPL CREATININE-BSD FRML MDRD: 122 ML/MIN/1.73SQ M
GLUCOSE SERPL-MCNC: 97 MG/DL (ref 65–140)
GLUCOSE UR STRIP-MCNC: NEGATIVE MG/DL
HCT VFR BLD AUTO: 39.9 % (ref 34.8–46.1)
HGB BLD-MCNC: 13.3 G/DL (ref 11.5–15.4)
HGB UR QL STRIP.AUTO: ABNORMAL
IMM GRANULOCYTES # BLD AUTO: 0.02 THOUSAND/UL (ref 0–0.2)
IMM GRANULOCYTES NFR BLD AUTO: 0 % (ref 0–2)
KETONES UR STRIP-MCNC: NEGATIVE MG/DL
LEUKOCYTE ESTERASE UR QL STRIP: NEGATIVE
LIPASE SERPL-CCNC: 15 U/L (ref 11–82)
LYMPHOCYTES # BLD AUTO: 1.18 THOUSANDS/ÂΜL (ref 0.6–4.47)
LYMPHOCYTES NFR BLD AUTO: 22 % (ref 14–44)
MCH RBC QN AUTO: 28.4 PG (ref 26.8–34.3)
MCHC RBC AUTO-ENTMCNC: 33.3 G/DL (ref 31.4–37.4)
MCV RBC AUTO: 85 FL (ref 82–98)
MONOCYTES # BLD AUTO: 0.43 THOUSAND/ÂΜL (ref 0.17–1.22)
MONOCYTES NFR BLD AUTO: 8 % (ref 4–12)
NEUTROPHILS # BLD AUTO: 3.45 THOUSANDS/ÂΜL (ref 1.85–7.62)
NEUTS SEG NFR BLD AUTO: 65 % (ref 43–75)
NITRITE UR QL STRIP: NEGATIVE
NON-SQ EPI CELLS URNS QL MICRO: NORMAL /HPF
NRBC BLD AUTO-RTO: 0 /100 WBCS
PH UR STRIP.AUTO: 7 [PH]
PLATELET # BLD AUTO: 262 THOUSANDS/UL (ref 149–390)
PMV BLD AUTO: 9.5 FL (ref 8.9–12.7)
POTASSIUM SERPL-SCNC: 3.6 MMOL/L (ref 3.5–5.3)
PROT SERPL-MCNC: 7.2 G/DL (ref 6.4–8.4)
PROT UR STRIP-MCNC: NEGATIVE MG/DL
RBC # BLD AUTO: 4.68 MILLION/UL (ref 3.81–5.12)
RBC #/AREA URNS AUTO: NORMAL /HPF
SODIUM SERPL-SCNC: 138 MMOL/L (ref 135–147)
SP GR UR STRIP.AUTO: 1 (ref 1–1.03)
UROBILINOGEN UR STRIP-ACNC: <2 MG/DL
WBC # BLD AUTO: 5.32 THOUSAND/UL (ref 4.31–10.16)
WBC #/AREA URNS AUTO: NORMAL /HPF

## 2024-03-09 PROCEDURE — 93005 ELECTROCARDIOGRAM TRACING: CPT

## 2024-03-09 PROCEDURE — 96374 THER/PROPH/DIAG INJ IV PUSH: CPT

## 2024-03-09 PROCEDURE — 99285 EMERGENCY DEPT VISIT HI MDM: CPT | Performed by: EMERGENCY MEDICINE

## 2024-03-09 PROCEDURE — 85025 COMPLETE CBC W/AUTO DIFF WBC: CPT | Performed by: EMERGENCY MEDICINE

## 2024-03-09 PROCEDURE — 80053 COMPREHEN METABOLIC PANEL: CPT | Performed by: EMERGENCY MEDICINE

## 2024-03-09 PROCEDURE — 99284 EMERGENCY DEPT VISIT MOD MDM: CPT

## 2024-03-09 PROCEDURE — 96361 HYDRATE IV INFUSION ADD-ON: CPT

## 2024-03-09 PROCEDURE — 83690 ASSAY OF LIPASE: CPT | Performed by: EMERGENCY MEDICINE

## 2024-03-09 PROCEDURE — 81001 URINALYSIS AUTO W/SCOPE: CPT | Performed by: EMERGENCY MEDICINE

## 2024-03-09 PROCEDURE — 36415 COLL VENOUS BLD VENIPUNCTURE: CPT | Performed by: EMERGENCY MEDICINE

## 2024-03-09 PROCEDURE — 81025 URINE PREGNANCY TEST: CPT | Performed by: EMERGENCY MEDICINE

## 2024-03-09 PROCEDURE — 74177 CT ABD & PELVIS W/CONTRAST: CPT

## 2024-03-09 RX ORDER — KETOROLAC TROMETHAMINE 30 MG/ML
15 INJECTION, SOLUTION INTRAMUSCULAR; INTRAVENOUS ONCE
Status: COMPLETED | OUTPATIENT
Start: 2024-03-09 | End: 2024-03-09

## 2024-03-09 RX ORDER — NAPROXEN 375 MG/1
375 TABLET ORAL 2 TIMES DAILY WITH MEALS
Qty: 20 TABLET | Refills: 0 | Status: SHIPPED | OUTPATIENT
Start: 2024-03-09

## 2024-03-09 RX ADMIN — SODIUM CHLORIDE 1000 ML: 0.9 INJECTION, SOLUTION INTRAVENOUS at 15:20

## 2024-03-09 RX ADMIN — KETOROLAC TROMETHAMINE 15 MG: 30 INJECTION, SOLUTION INTRAMUSCULAR; INTRAVENOUS at 15:30

## 2024-03-09 RX ADMIN — IOHEXOL 100 ML: 350 INJECTION, SOLUTION INTRAVENOUS at 16:36

## 2024-03-09 NOTE — ED PROVIDER NOTES
History  Chief Complaint   Patient presents with    Abdominal Pain     Pt reports generalized abdominal pain and headache for past 3 days. Pt reports she was out when she started to feel lightheaded and nauseous. Pt c/o stabbing RLQ pain.       History provided by:  Patient   used: No    Abdominal Pain  Pain location:  RLQ  Pain quality: stabbing    Pain radiates to:  Does not radiate  Pain severity:  Moderate  Onset quality:  Gradual  Duration:  3 days  Timing:  Constant  Progression:  Worsening  Chronicity:  New  Relieved by:  Nothing  Worsened by:  Nothing  Ineffective treatments:  None tried  Associated symptoms: nausea    Associated symptoms: no chest pain, no chills, no cough, no dysuria, no fever, no hematuria, no shortness of breath, no sore throat and no vomiting        Prior to Admission Medications   Prescriptions Last Dose Informant Patient Reported? Taking?   Glycopyrronium Tosylate 2.4 % PADS   Yes No   Sig: Apply topically   albuterol (PROVENTIL HFA,VENTOLIN HFA) 90 mcg/act inhaler   No No   Sig: TAKE 2 PUFFS BY MOUTH EVERY 6 HOURS AS NEEDED FOR WHEEZE   amoxicillin (AMOXIL) 500 mg capsule   Yes No   Sig: TAKE 1 CAPSULE BY MOUTH TWICE A DAY FOR 10 DAYS   Patient not taking: Reported on 9/18/2023   diclofenac (VOLTAREN) 75 mg EC tablet   No No   Sig: TAKE 1 TABLET BY MOUTH TWICE A DAY   ergocalciferol (VITAMIN D2) 50,000 units   No No   Sig: TAKE 1 CAPSULE BY MOUTH ONE TIME PER WEEK   fluticasone (FLONASE) 50 mcg/act nasal spray   No No   Sig: SPRAY 1 SPRAY INTO EACH NOSTRIL EVERY DAY   guaifenesin-codeine (GUAIFENESIN AC) 100-10 MG/5ML liquid   No No   Sig: Take 5 mL by mouth 3 (three) times a day as needed for cough   loratadine (CLARITIN) 10 mg tablet   No No   Sig: TAKE 1 TABLET BY MOUTH EVERY DAY   medroxyPROGESTERone acetate (DEPO-PROVERA SYRINGE) 150 mg/mL injection   Yes No   Sig: INJECT 150 MG INTRAMUSCULAR EVERY 12 WEEKS   nystatin (MYCOSTATIN) cream   No No   Sig: Apply 2  g (1 Application total) topically 2 (two) times a day To affected area   ondansetron (ZOFRAN-ODT) 4 mg disintegrating tablet   No No   Sig: Take 1 tablet (4 mg total) by mouth every 6 (six) hours as needed for nausea or vomiting   pantoprazole (PROTONIX) 40 mg tablet   No No   Sig: TAKE 1 TABLET BY MOUTH DAILY BEFORE BREAKFAST   tretinoin (RETIN-A) 0.025 % cream   Yes No      Facility-Administered Medications: None       Past Medical History:   Diagnosis Date    Acne     ADHD     Anxiety     Asthma     Concussion     X 2 IN 2017    Depression     Fracture of lumbar spine (HCC)     GERD (gastroesophageal reflux disease)     Hydronephrosis     Pertussis     Stress fracture     Vesicoureteral reflux        Past Surgical History:   Procedure Laterality Date    CYST REMOVAL      Left forearm    WISDOM TOOTH EXTRACTION         Family History   Problem Relation Age of Onset    Hypertension Mother     Heart murmur Mother     Depression Mother     Anxiety disorder Mother     Celiac disease Father     ADD / ADHD Father     Cancer Paternal Grandmother         adrenal gland    Celiac disease Paternal Grandfather     Anxiety disorder Sister     Depression Sister     ADD / ADHD Sister      I have reviewed and agree with the history as documented.    E-Cigarette/Vaping    E-Cigarette Use Former User      E-Cigarette/Vaping Substances    Nicotine No     THC No     CBD No     Flavoring No     Other No     Unknown No      Social History     Tobacco Use    Smoking status: Never    Smokeless tobacco: Former   Vaping Use    Vaping status: Former   Substance Use Topics    Alcohol use: Never    Drug use: Never       Review of Systems   Constitutional:  Negative for chills and fever.   HENT:  Negative for ear pain and sore throat.    Eyes:  Negative for visual disturbance.   Respiratory:  Negative for cough and shortness of breath.    Cardiovascular:  Negative for chest pain and palpitations.   Gastrointestinal:  Positive for abdominal  pain and nausea. Negative for vomiting.   Genitourinary:  Negative for dysuria and hematuria.   Musculoskeletal:  Negative for arthralgias and back pain.   Skin:  Negative for color change and rash.   Neurological:  Negative for seizures and syncope.   All other systems reviewed and are negative.      Physical Exam  Physical Exam  Vitals reviewed.   Constitutional:       Appearance: She is well-developed. She is not diaphoretic.   HENT:      Head: Normocephalic and atraumatic.   Eyes:      General: No scleral icterus.     Conjunctiva/sclera: Conjunctivae normal.      Pupils: Pupils are equal, round, and reactive to light.   Neck:      Vascular: No JVD.      Trachea: No tracheal deviation.   Cardiovascular:      Rate and Rhythm: Normal rate and regular rhythm.   Pulmonary:      Effort: Pulmonary effort is normal. No respiratory distress.      Breath sounds: Normal breath sounds.   Abdominal:      General: There is no distension.      Palpations: Abdomen is soft.      Tenderness: There is abdominal tenderness.   Musculoskeletal:         General: No tenderness or deformity. Normal range of motion.      Cervical back: Normal range of motion and neck supple.   Lymphadenopathy:      Cervical: No cervical adenopathy.   Skin:     General: Skin is warm and dry.      Findings: No rash.   Neurological:      General: No focal deficit present.      Mental Status: She is alert and oriented to person, place, and time.      Comments: No gross focal sensory or motor deficits   Psychiatric:         Mood and Affect: Mood normal.         Vital Signs  ED Triage Vitals [03/09/24 1431]   Temperature Pulse Respirations Blood Pressure SpO2   98.8 °F (37.1 °C) (!) 122 18 149/77 98 %      Temp Source Heart Rate Source Patient Position - Orthostatic VS BP Location FiO2 (%)   Oral Monitor Sitting Right arm --      Pain Score       7           Vitals:    03/09/24 1431 03/09/24 1521 03/09/24 1743   BP: 149/77 129/81 117/76   Pulse: (!) 122 102  104   Patient Position - Orthostatic VS: Sitting Sitting Lying         Visual Acuity      ED Medications  Medications   sodium chloride 0.9 % bolus 1,000 mL (0 mL Intravenous Stopped 3/9/24 1743)   ketorolac (TORADOL) injection 15 mg (15 mg Intravenous Given 3/9/24 1530)   iohexol (OMNIPAQUE) 350 MG/ML injection (MULTI-DOSE) 100 mL (100 mL Intravenous Given 3/9/24 1636)       Diagnostic Studies  Results Reviewed       Procedure Component Value Units Date/Time    Comprehensive metabolic panel [[id number]] Collected: 03/09/24 1515    Lab Status: Final result Specimen: Blood from Arm, Right Updated: 03/09/24 1548     Sodium 138 mmol/L      Potassium 3.6 mmol/L      Chloride 106 mmol/L      CO2 25 mmol/L      ANION GAP 7 mmol/L      BUN 8 mg/dL      Creatinine 0.71 mg/dL      Glucose 97 mg/dL      Calcium 9.4 mg/dL      AST 17 U/L      ALT 23 U/L      Alkaline Phosphatase 76 U/L      Total Protein 7.2 g/dL      Albumin 4.6 g/dL      Total Bilirubin 0.28 mg/dL      eGFR 122 ml/min/1.73sq m     Narrative:      National Kidney Disease Foundation guidelines for Chronic Kidney Disease (CKD):     Stage 1 with normal or high GFR (GFR > 90 mL/min/1.73 square meters)    Stage 2 Mild CKD (GFR = 60-89 mL/min/1.73 square meters)    Stage 3A Moderate CKD (GFR = 45-59 mL/min/1.73 square meters)    Stage 3B Moderate CKD (GFR = 30-44 mL/min/1.73 square meters)    Stage 4 Severe CKD (GFR = 15-29 mL/min/1.73 square meters)    Stage 5 End Stage CKD (GFR <15 mL/min/1.73 square meters)  Note: GFR calculation is accurate only with a steady state creatinine    Lipase [[id number]]  (Normal) Collected: 03/09/24 1515    Lab Status: Final result Specimen: Blood from Arm, Right Updated: 03/09/24 1548     Lipase 15 u/L     Urine Microscopic [[id number]]  (Normal) Collected: 03/09/24 1518    Lab Status: Final result Specimen: Urine, Clean Catch Updated: 03/09/24 1539     RBC, UA 1-2 /hpf      WBC, UA None Seen /hpf      Epithelial Cells Occasional /hpf       Bacteria, UA Occasional /hpf     UA (URINE) with reflex to Scope [[id number]]  (Abnormal) Collected: 03/09/24 1518    Lab Status: Final result Specimen: Urine, Clean Catch Updated: 03/09/24 1538     Color, UA Colorless     Clarity, UA Clear     Specific Gravity, UA 1.002     pH, UA 7.0     Leukocytes, UA Negative     Nitrite, UA Negative     Protein, UA Negative mg/dl      Glucose, UA Negative mg/dl      Ketones, UA Negative mg/dl      Urobilinogen, UA <2.0 mg/dl      Bilirubin, UA Negative     Occult Blood, UA Moderate    CBC and differential [[id number]] Collected: 03/09/24 1515    Lab Status: Final result Specimen: Blood from Arm, Right Updated: 03/09/24 1531     WBC 5.32 Thousand/uL      RBC 4.68 Million/uL      Hemoglobin 13.3 g/dL      Hematocrit 39.9 %      MCV 85 fL      MCH 28.4 pg      MCHC 33.3 g/dL      RDW 12.6 %      MPV 9.5 fL      Platelets 262 Thousands/uL      nRBC 0 /100 WBCs      Neutrophils Relative 65 %      Immat GRANS % 0 %      Lymphocytes Relative 22 %      Monocytes Relative 8 %      Eosinophils Relative 4 %      Basophils Relative 1 %      Neutrophils Absolute 3.45 Thousands/µL      Immature Grans Absolute 0.02 Thousand/uL      Lymphocytes Absolute 1.18 Thousands/µL      Monocytes Absolute 0.43 Thousand/µL      Eosinophils Absolute 0.20 Thousand/µL      Basophils Absolute 0.04 Thousands/µL     POCT pregnancy, urine [[id number]]  (Normal) Resulted: 03/09/24 1530    Lab Status: Final result Updated: 03/09/24 1530     EXT Preg Test, Ur Negative     Control Valid                   CT abdomen pelvis with contrast   Final Result by Steffen Scott MD (03/09 1746)      No acute abnormality in the abdomen or pelvis.         Workstation performed: [id number]                    Procedures  Procedures         ED Course  ED Course as of 03/09/24 1951   Sat Mar 09, 2024   1635 EKG independently interpreted by me: Sinus tachycardia at rate of 118, normal axis, normal intervals, nonspecific ST  "abnormality that may be rate related.  No significant ST elevations or depressions to suggest cardiac ischemia.         ALIEFFT      Flowsheet Row Most Recent Value   ANDREI Initial Screen: During the past 12 months, did you:    1. Drink any alcohol (more than a few sips)?  No Filed at: 03/09/2024 1430   2. Smoke any marijuana or hashish No Filed at: 03/09/2024 1430   3. Use anything else to get high? (\"anything else\" includes illegal drugs, over the counter and prescription drugs, and things that you sniff or 'nava')? No Filed at: 03/09/2024 1430                                            Medical Decision Making  Richard is a 19 yo F here for evaluation of RLQ pain.  Says that for about 3 days she has had some generalized lower abdominal/pelvic pain and cramping.  She is currently having some menstrual spotting and thought that this was the cause of her symptoms initially.  However, today a few hours prior to presentation her pain seemed to localize to her right lower quadrant and became more intense.  She denies chest pain or shortness of breath but reports some mild lightheadedness.  Given complaint of lightheadedness twelve-lead EKG was performed to rule out cardiac arrhythmias and was reassuring.  Additionally laboratory studies performed to rule out significant leukocytosis, electrolyte abnormalities, pancreatitis, biliary obstructive disease were also reassuring.  Patient did have sinus tachycardia although on further evaluation patient notes that she has this chronically and has been previously worked up for the same.  CT imaging was negative for appendicitis, ureteral obstructive disease, or other intra-abdominal pathology.  Her symptoms resolved with only IV Toradol.  Will plan to discharge with NSAIDs for symptomatic treatment, outpatient follow-up and return precautions.    Amount and/or Complexity of Data Reviewed  Labs: ordered. Decision-making details documented in ED Course.  Radiology: ordered. " Decision-making details documented in ED Course.  ECG/medicine tests: ordered and independent interpretation performed. Decision-making details documented in ED Course.    Risk  Prescription drug management.             Disposition  Final diagnoses:   Abdominal pain     Time reflects when diagnosis was documented in both MDM as applicable and the Disposition within this note       Time User Action Codes Description Comment    3/9/2024  5:49 PM Ej Colon Add [R10.9] Abdominal pain           ED Disposition       ED Disposition   Discharge    Condition   Stable    Date/Time   Sat Mar 9, 2024 1742    Comment   Richard Markham discharge to home/self care.                   Follow-up Information       Follow up With Specialties Details Why Contact Info    Tomy Montoya MD Family Medicine   [address] [place] [id number] [phone]              Discharge Medication List as of 3/9/2024  5:50 PM        START taking these medications    Details   naproxen (NAPROSYN) 375 mg tablet Take 1 tablet (375 mg total) by mouth 2 (two) times a day with meals, Starting Sat 3/9/2024, Normal           CONTINUE these medications which have NOT CHANGED    Details   albuterol (PROVENTIL HFA,VENTOLIN HFA) 90 mcg/act inhaler TAKE 2 PUFFS BY MOUTH EVERY 6 HOURS AS NEEDED FOR WHEEZE, Normal      amoxicillin (AMOXIL) 500 mg capsule TAKE 1 CAPSULE BY MOUTH TWICE A DAY FOR 10 DAYS, Historical Med      diclofenac (VOLTAREN) 75 mg EC tablet TAKE 1 TABLET BY MOUTH TWICE A DAY, Starting Mon 11/13/2023, Normal      ergocalciferol (VITAMIN D2) 50,000 units TAKE 1 CAPSULE BY MOUTH ONE TIME PER WEEK, Normal      fluticasone (FLONASE) 50 mcg/act nasal spray SPRAY 1 SPRAY INTO EACH NOSTRIL EVERY DAY, Normal      Glycopyrronium Tosylate 2.4 % PADS Apply topically, Historical Med      guaifenesin-codeine (GUAIFENESIN AC) 100-10 MG/5ML liquid Take 5 mL by mouth 3 (three) times a day as needed for cough, Starting Fri 10/20/2023, Normal       loratadine (CLARITIN) 10 mg tablet TAKE 1 TABLET BY MOUTH EVERY DAY, Normal      medroxyPROGESTERone acetate (DEPO-PROVERA SYRINGE) 150 mg/mL injection INJECT 150 MG INTRAMUSCULAR EVERY 12 WEEKS, Historical Med      nystatin (MYCOSTATIN) cream Apply 2 g (1 Application total) topically 2 (two) times a day To affected area, Starting Tue 11/14/2023, Normal      ondansetron (ZOFRAN-ODT) 4 mg disintegrating tablet Take 1 tablet (4 mg total) by mouth every 6 (six) hours as needed for nausea or vomiting, Starting Tue 1/23/2024, Normal      pantoprazole (PROTONIX) 40 mg tablet TAKE 1 TABLET BY MOUTH DAILY BEFORE BREAKFAST, Starting Tue 10/10/2023, Normal      tretinoin (RETIN-A) 0.025 % cream Historical Med             No discharge procedures on file.    PDMP Review         Value Time User    PDMP Reviewed  Yes 10/20/2023  1:55 PM Tomy Montoya MD            ED Provider  Electronically Signed by             Ej Colon MD  03/09/24 1951

## 2024-03-09 NOTE — Clinical Note
Richard Markham was seen and treated in our emergency department on 3/9/2024.                Diagnosis:     Richard  may return to work on return date.    She may return on this date: 03/12/2024         If you have any questions or concerns, please don't hesitate to call.      Ej Colon MD    ______________________________           _______________          _______________  Hospital Representative                              Date                                Time

## 2024-03-10 LAB
ATRIAL RATE: 118 BPM
P AXIS: 51 DEGREES
PR INTERVAL: 170 MS
QRS AXIS: 63 DEGREES
QRSD INTERVAL: 80 MS
QT INTERVAL: 314 MS
QTC INTERVAL: 440 MS
T WAVE AXIS: -5 DEGREES
VENTRICULAR RATE: 118 BPM

## 2024-03-10 PROCEDURE — 93010 ELECTROCARDIOGRAM REPORT: CPT | Performed by: INTERNAL MEDICINE

## 2024-03-18 DIAGNOSIS — R21 RASH: ICD-10-CM

## 2024-03-19 RX ORDER — NYSTATIN 100000 U/G
1 CREAM TOPICAL 2 TIMES DAILY
Qty: 30 G | Refills: 0 | Status: SHIPPED | OUTPATIENT
Start: 2024-03-19

## 2024-04-13 ENCOUNTER — NURSE TRIAGE (OUTPATIENT)
Dept: OTHER | Facility: OTHER | Age: 19
End: 2024-04-13

## 2024-04-13 NOTE — TELEPHONE ENCOUNTER
"Answer Assessment - Initial Assessment Questions  1. TEST: \"Was it a urinalysis or a urine culture for UTI?\"      Both    2. URINALYSIS RESULT: \"Was it positive or negative?\"  If positive, document what was positive (e.g., LE, WBC, RBC, Bacteria, Epithelial Cells)      Patient reports that the labs that [facility] ordered showed that BV was not detected and candida group was detected    3. FEVER: \"Do you have a fever?\" If Yes, ask: \"What is your temperature, how was it measured, and when did it start?\"      No fevers    4. FLANK PAIN: \"Do you have any pain in your side?\"      No    5. PREGNANCY: \"Is there any chance you are pregnant?\" \"When was your last menstrual period?\"      Denies    6. PHENAZOPYRIDINE (Uristat, Pyridium): \"Have you taken phenazopyridine (turns your urine orange) recently?\"      Denies    7. OTHER SYMPTOMS:      Burning sensation and vaginal discomfort    Protocols used: Urinalysis Results Follow-up Call-[facility]      Patient was seen at [facility] last night and concerned if the right medication was prescribed. Per provider, patient's chart was reviewed and should continue taking medication as prescribed. Patient verbalized understanding.   "

## 2024-04-13 NOTE — TELEPHONE ENCOUNTER
"Regarding: question and concerns about medication  ----- Message from Kristel Brown sent at 4/13/2024 11:27 AM EDT -----  \"I was seen at  [facility]  yesterday and I was prescribed medication that has nothing to do with what it going on with me based on my test results. I am not sure what to do \"    "

## 2024-05-08 ENCOUNTER — TELEPHONE (OUTPATIENT)
Age: 19
End: 2024-05-08

## 2024-05-08 DIAGNOSIS — M43.17 SPONDYLOLISTHESIS AT L5-S1 LEVEL: ICD-10-CM

## 2024-05-08 DIAGNOSIS — M43.07 SPONDYLOLYSIS, LUMBOSACRAL: ICD-10-CM

## 2024-05-08 DIAGNOSIS — G89.29 CHRONIC BILATERAL LOW BACK PAIN WITHOUT SCIATICA: Primary | ICD-10-CM

## 2024-05-08 DIAGNOSIS — M54.50 CHRONIC BILATERAL LOW BACK PAIN WITHOUT SCIATICA: Primary | ICD-10-CM

## 2024-05-08 NOTE — TELEPHONE ENCOUNTER
Caller: Patient    Doctor: Jimena    Reason for call:     Patient calling about her lower back, she is asking if the doctor can write a script for spine and pain management?  She states she discussed this with the doctor, but no orders in the system.  Please call and advise.    Call back#: [phone]

## 2024-05-16 ENCOUNTER — OFFICE VISIT (OUTPATIENT)
Dept: FAMILY MEDICINE CLINIC | Facility: CLINIC | Age: 19
End: 2024-05-16
Payer: COMMERCIAL

## 2024-05-16 VITALS
DIASTOLIC BLOOD PRESSURE: 76 MMHG | BODY MASS INDEX: 31.45 KG/M2 | OXYGEN SATURATION: 98 % | RESPIRATION RATE: 16 BRPM | HEART RATE: 95 BPM | SYSTOLIC BLOOD PRESSURE: 116 MMHG | TEMPERATURE: 98.9 F | HEIGHT: 61 IN | WEIGHT: 166.6 LBS

## 2024-05-16 DIAGNOSIS — M26.609 TMJ DYSFUNCTION: Primary | ICD-10-CM

## 2024-05-16 PROBLEM — M26.629 TMJ PAIN DYSFUNCTION SYNDROME: Status: RESOLVED | Noted: 2024-05-16 | Resolved: 2024-05-16

## 2024-05-16 PROBLEM — M26.629 TMJ PAIN DYSFUNCTION SYNDROME: Status: ACTIVE | Noted: 2024-05-16

## 2024-05-16 PROCEDURE — 99213 OFFICE O/P EST LOW 20 MIN: CPT | Performed by: NURSE PRACTITIONER

## 2024-05-16 RX ORDER — FLUCONAZOLE 40 MG/ML
POWDER, FOR SUSPENSION ORAL
COMMUNITY
Start: 2024-04-14

## 2024-05-16 RX ORDER — CYCLOBENZAPRINE HCL 5 MG
5 TABLET ORAL
Qty: 30 TABLET | Refills: 1 | Status: SHIPPED | OUTPATIENT
Start: 2024-05-16

## 2024-05-16 NOTE — PROGRESS NOTES
Ambulatory Visit  Name: Richard Markham      : 2005      MRN: [id number]  Encounter Provider: LUISA Rasmussen  Encounter Date: 2024   Encounter department: [facility] [address] [facility]    Assessment & Plan   1. TMJ dysfunction  Assessment & Plan:  - Prescription sent for flexeril at bedtime. Advised of side effects.  - Follow up with Oral Surgeon - has appointment in .   Orders:  -     cyclobenzaprine (FLEXERIL) 5 mg tablet; Take 1 tablet (5 mg total) by mouth daily at bedtime       History of Present Illness   {Disappearing Hyperlinks I Encounters * My Last Note * Since Last Visit * History :25078}  Patient presents to office today with complaints of right sided jaw and ear pain that started yesterday. She has been taking Advil with no relief. She does have known TMJ. She is seeing an oral surgeon in . She denies any cough, congestion, fever, or other URI symptoms.         Earache   There is pain in the right ear. This is a new problem. The current episode started yesterday. The problem occurs constantly. The problem has been gradually worsening. There has been no fever. The fever has been present for Less than 1 day. The pain is at a severity of 5/10. Pertinent negatives include no abdominal pain, coughing, diarrhea, ear discharge, headaches, hearing loss, neck pain, rash, rhinorrhea, sore throat or vomiting.     Review of Systems   Constitutional:  Negative for fatigue and fever.   HENT:  Positive for ear pain. Negative for ear discharge, hearing loss, rhinorrhea, sore throat and trouble swallowing.    Eyes:  Negative for visual disturbance.   Respiratory:  Negative for cough and shortness of breath.    Cardiovascular:  Negative for chest pain and palpitations.   Gastrointestinal:  Negative for abdominal pain, blood in stool, diarrhea and vomiting.   Endocrine: Negative for cold intolerance and heat intolerance.   Genitourinary:  Negative for difficulty urinating and dysuria.    Musculoskeletal:  Negative for gait problem and neck pain.   Skin:  Negative for rash.   Neurological:  Negative for dizziness, syncope and headaches.   Hematological:  Negative for adenopathy.   Psychiatric/Behavioral:  Negative for behavioral problems.      Past Medical History:   Diagnosis Date   • Acne    • ADHD    • Anxiety    • Asthma    • Concussion     X 2 IN 2017   • Depression    • Fracture of lumbar spine (HCC)    • GERD (gastroesophageal reflux disease)    • Hydronephrosis    • Pertussis    • Stress fracture    • Vesicoureteral reflux      Past Surgical History:   Procedure Laterality Date   • CYST REMOVAL      Left forearm   • WISDOM TOOTH EXTRACTION       Family History   Problem Relation Age of Onset   • Hypertension Mother    • Heart murmur Mother    • Depression Mother    • Anxiety disorder Mother    • Celiac disease Father    • ADD / ADHD Father    • Cancer Paternal Grandmother         adrenal gland   • Celiac disease Paternal Grandfather    • Anxiety disorder Sister    • Depression Sister    • ADD / ADHD Sister      Social History     Tobacco Use   • Smoking status: Never   • Smokeless tobacco: Former   Vaping Use   • Vaping status: Former   Substance and Sexual Activity   • Alcohol use: Never   • Drug use: Never   • Sexual activity: Not on file     Current Outpatient Medications on File Prior to Visit   Medication Sig   • albuterol (PROVENTIL HFA,VENTOLIN HFA) 90 mcg/act inhaler TAKE 2 PUFFS BY MOUTH EVERY 6 HOURS AS NEEDED FOR WHEEZE   • diclofenac (VOLTAREN) 75 mg EC tablet TAKE 1 TABLET BY MOUTH TWICE A DAY   • ergocalciferol (VITAMIN D2) 50,000 units TAKE 1 CAPSULE BY MOUTH ONE TIME PER WEEK   • fluconazole (DIFLUCAN) 40 mg/mL suspension TAKE 3.8 ML (152 MG TOTAL) BY MOUTH ONE TIME FOR 1 DOSE.   • fluticasone (FLONASE) 50 mcg/act nasal spray SPRAY 1 SPRAY INTO EACH NOSTRIL EVERY DAY   • Glycopyrronium Tosylate 2.4 % PADS Apply topically   • guaifenesin-codeine (GUAIFENESIN AC) 100-10 MG/5ML  liquid Take 5 mL by mouth 3 (three) times a day as needed for cough   • loratadine (CLARITIN) 10 mg tablet TAKE 1 TABLET BY MOUTH EVERY DAY   • naproxen (NAPROSYN) 375 mg tablet Take 1 tablet (375 mg total) by mouth 2 (two) times a day with meals   • nystatin (MYCOSTATIN) cream Apply 2 g (1 Application total) topically 2 (two) times a day To affected area   • ondansetron (ZOFRAN-ODT) 4 mg disintegrating tablet Take 1 tablet (4 mg total) by mouth every 6 (six) hours as needed for nausea or vomiting   • pantoprazole (PROTONIX) 40 mg tablet TAKE 1 TABLET BY MOUTH DAILY BEFORE BREAKFAST   • tretinoin (RETIN-A) 0.025 % cream    • amoxicillin (AMOXIL) 500 mg capsule TAKE 1 CAPSULE BY MOUTH TWICE A DAY FOR 10 DAYS (Patient not taking: Reported on 9/18/2023)   • medroxyPROGESTERone acetate (DEPO-PROVERA SYRINGE) 150 mg/mL injection INJECT 150 MG INTRAMUSCULAR EVERY 12 WEEKS (Patient not taking: Reported on 5/16/2024)     Allergies   Allergen Reactions   • Kiwi Extract - Food Allergy Shortness Of Breath   • James Flavor - Food Allergy Itching     Immunization History   Administered Date(s) Administered   • DTaP 2003, 02/19/2004, 05/19/2004, 03/15/2005, 08/22/2008   • HPV Quadrivalent 08/20/2014, 11/04/2014   • HPV9 06/18/2019   • Hep B, Adolescent or Pediatric 2003, 2003, 05/19/2004   • Hepatitis A 08/30/2012, 08/23/2013   • HiB 2005, 2003, 02/19/2004, 11/22/2004   • Hib (PRP-T) 2003, 2003, 02/19/2004, 11/22/2004   • INFLUENZA 10/12/2004, 11/22/2004, 11/01/2006, 08/22/2008, 10/02/2009, 08/20/2010, 08/22/2011, 08/30/2012, 08/23/2013, 11/04/2014, 10/02/2015, 09/27/2016, 11/05/2017, 10/09/2018, 09/17/2019, 09/29/2020, 09/24/2021, 09/13/2023   • IPV 2005, 2003, 03/15/2005, 08/22/2008   • Influenza, injectable, quadrivalent, preservative free 0.5 mL 09/17/2019, 09/29/2020, 09/24/2021, 09/13/2023   • MMR 08/20/2004, 08/30/2007   • Meningococcal MCV4, Unspecified 08/20/2014,  "03/17/2020   • Meningococcal MCV4P 08/20/2014, 03/17/2020   • Pneumococcal Conjugate PCV 7 2003, 2003, 02/19/2004, 11/22/2004   • Tdap 08/20/2014   • Tuberculin Skin Test-PPD Intradermal 02/25/2022, 09/13/2023   • Varicella 08/20/2004, 08/30/2007     Objective   {Disappearing Hyperlinks   Review Vitals * Enter New Vitals * Results Review * Labs * Imaging * Cardiology * Procedures * Lung Cancer Screening :56862}  /76 (BP Location: Left arm, Patient Position: Sitting, Cuff Size: Large)   Pulse 95   Temp 98.9 °F (37.2 °C) (Tympanic)   Resp 16   Ht 5' 1\" (1.549 m)   Wt 75.6 kg (166 lb 9.6 oz)   SpO2 98%   BMI 31.48 kg/m²     Physical Exam  Vitals and nursing note reviewed.   Constitutional:       Appearance: Normal appearance. She is well-developed.   HENT:      Head: Normocephalic and atraumatic.      Right Ear: Tympanic membrane, ear canal and external ear normal.      Left Ear: Tympanic membrane, ear canal and external ear normal.      Nose: Nose normal.      Mouth/Throat:      Mouth: Mucous membranes are moist.   Eyes:      Conjunctiva/sclera: Conjunctivae normal.   Cardiovascular:      Rate and Rhythm: Normal rate and regular rhythm.      Heart sounds: Normal heart sounds.   Pulmonary:      Effort: Pulmonary effort is normal.      Breath sounds: Normal breath sounds.   Musculoskeletal:         General: Normal range of motion.      Cervical back: Normal range of motion.   Skin:     General: Skin is warm and dry.   Neurological:      Mental Status: She is alert and oriented to person, place, and time.   Psychiatric:         Mood and Affect: Mood normal.         Behavior: Behavior normal.       Administrative Statements {Disappearing Hyperlinks I  Level of Service * [facility]}        "

## 2024-05-16 NOTE — ASSESSMENT & PLAN NOTE
- Prescription sent for flexeril at bedtime. Advised of side effects.  - Follow up with Oral Surgeon - has appointment in June.

## 2024-05-17 ENCOUNTER — TELEPHONE (OUTPATIENT)
Dept: FAMILY MEDICINE CLINIC | Facility: CLINIC | Age: 19
End: 2024-05-17

## 2024-05-17 NOTE — TELEPHONE ENCOUNTER
It does not come in liquid. I advise to crush and take with applesauce or pudding if she has difficulty swallowing the tablet.

## 2024-05-17 NOTE — TELEPHONE ENCOUNTER
"Pt Patti message copied below:    \"Darius Paige I totally forgot when I was just there but is there any way you can send over a liquid form of a muscle relaxer to the pharmacy please and thank you.\"    Please advise. Pt was seen 5/16/24.  "

## 2024-05-22 ENCOUNTER — TELEPHONE (OUTPATIENT)
Dept: PSYCHIATRY | Facility: CLINIC | Age: 19
End: 2024-05-22

## 2024-05-22 ENCOUNTER — CONSULT (OUTPATIENT)
Dept: PAIN MEDICINE | Facility: CLINIC | Age: 19
End: 2024-05-22
Payer: COMMERCIAL

## 2024-05-22 VITALS
BODY MASS INDEX: 31.34 KG/M2 | HEIGHT: 61 IN | SYSTOLIC BLOOD PRESSURE: 117 MMHG | DIASTOLIC BLOOD PRESSURE: 68 MMHG | WEIGHT: 166 LBS

## 2024-05-22 DIAGNOSIS — M54.50 CHRONIC BILATERAL LOW BACK PAIN WITHOUT SCIATICA: ICD-10-CM

## 2024-05-22 DIAGNOSIS — M43.07 SPONDYLOLYSIS, LUMBOSACRAL: ICD-10-CM

## 2024-05-22 DIAGNOSIS — M43.17 SPONDYLOLISTHESIS AT L5-S1 LEVEL: ICD-10-CM

## 2024-05-22 DIAGNOSIS — G89.29 CHRONIC BILATERAL LOW BACK PAIN WITHOUT SCIATICA: ICD-10-CM

## 2024-05-22 DIAGNOSIS — M47.817 LUMBOSACRAL SPONDYLOSIS WITHOUT MYELOPATHY: Primary | ICD-10-CM

## 2024-05-22 PROCEDURE — 99204 OFFICE O/P NEW MOD 45 MIN: CPT | Performed by: ANESTHESIOLOGY

## 2024-05-22 NOTE — PROGRESS NOTES
Assessment  1. Lumbosacral spondylosis without myelopathy    2. Chronic bilateral low back pain without sciatica    3. Spondylolysis, lumbosacral    4. Spondylolisthesis at L5-S1 level      Patient accompanied by parent today who also provided independent history.    Patient presenting with chronic back pain for 8 years, worsened over the past year.  Pain is consistent with lumbar spondylosis accompanied by pain at times 7/10 on the pain scale with inability to participate in IADLs for >6 weeks. Patient has participated with physical therapy, chiropractic therapy as well as home exercises and stretches.  Patient has tried Tylenol and NSAIDs with limited benefit.  Denies any bowel or bladder incontinence, saddle anesthesia.    The patient has been experiencing moderate to severe axial spine pain that is causing functional deficit.  The pain has been present for at least 3 months and is not improving with conservative care.  Currently the patient is not experiencing any radicular features nor neurogenic claudication.  Non-facet pathology has been ruled out on clinical evaluation.    The patient's low back pain persists despite time, relative rest, activity modification and therapy. Based on the patient's symptoms examination, I suspect that the pain is being generated by the lumbar facet joints. The facet joints are only one of many possible low-back pain generators.      Independently reviewed and interpreted prior lumbar MRI, lumbar x-rays and bone scan.  Bone scan from 2016 showed active stress fractures in the right L5 pars articularis and inactive left L5 pars defect.  More recent lumbar x-ray showed bilateral L5 pars defects with grade 1 anterolisthesis of L5 on S1.  Lumbar MRI showed no disc herniation, central canal or foraminal pathology.    Plan    We will schedule the patient for bilateral L3-5 medial branch nerve blocks with intention of moving forward towards radiofrequency ablation if there is an  appropriate diagnostic response. The initial blocks will be performed with 2% lidocaine and if an appropriate response is obtained upon review of the patient's pain diary, a confirmatory block will be scheduled with 0.5% bupivacaine.  In the office today, we reviewed the nature of facet joint pathology in depth using a spine model. We discussed the approach we would use for the injections and provided literature for home review. The patient understands the risks associated with the procedure including bleeding, infection, tissue injury, and allergic reaction and provided verbal informed consent in the office today.     Reviewed external notes from orthopedic sports medicine (12/29/2023), family medicine (5/16/2024) in regards to recent and prior relevant medical histories, treatment recommendations, medication and/or interventional treatment responses.    Reviewed hemoglobin A1c, renal function, CBC and/or PT/INR prior to discussing/offering interventional modalities.    [place] Prescription Drug Monitoring Program report was reviewed and was appropriate     My impressions and treatment recommendations were discussed in detail with the patient who verbalized understanding and had no further questions.  Discharge instructions were provided. I personally saw and examined the patient and I agree with the above discussed plan of care.    Orders Placed This Encounter   Procedures    FL spine and pain procedure     Standing Status:   Future     Standing Expiration Date:   5/22/2028     Order Specific Question:   Reason for Exam:     Answer:   bilateral L4 and L5 MBB #1     Order Specific Question:   Is the patient pregnant?     Answer:   No     Order Specific Question:   Anticoagulant hold needed?     Answer:   no     No orders of the defined types were placed in this encounter.      History of Present Illness    Richard Markham is a 18 y.o. female presenting for consultation (referred by Dr. Hess) at [facility] for exam and evaluation of chronic bilateral R>L low back pain for 8 years, worsening over the past year. Pain started following a non work related injury. Over the past month, the intensity of pain has been Moderate to severe. Pain is currently 4/10. Pain does interfere with age appropriate activities of daily living. Pain is constant, with no typical pattern throughout the day. Pain is described as sharp with numbness and pins/needles. Patient endorses weakness in the lower extremities. Assistance device used: None.    Pain is increased with lying down, bending.   Pain is decreased with resting.    Treatments tried:   Heat/ice  PT: yes  Chiropractic therapy: yes  Injections: no   Previous spine surgery: No    Anticoagulation: no    Medications tried:   Tylenol, ibuprofen, naproxen, diclofenac    I have personally reviewed and/or updated the patient's past medical history, past surgical history, family history, social history, current medications, allergies, and vital signs today.     Review of Systems   Constitutional:  Negative for chills and fever.   HENT:  Negative for ear pain and sore throat.    Eyes:  Negative for pain and visual disturbance.   Respiratory:  Negative for cough and shortness of breath.    Cardiovascular:  Negative for chest pain and palpitations.   Gastrointestinal:  Negative for abdominal pain and vomiting.   Genitourinary:  Negative for dysuria and hematuria.   Musculoskeletal:  Negative for arthralgias and back pain.   Skin:  Negative for color change and rash.   Neurological:  Positive for numbness. Negative for seizures and syncope.   All other systems reviewed and are negative.      Patient Active Problem List   Diagnosis    Attention deficit hyperactivity disorder (ADHD), combined type    REUBEN (generalized anxiety disorder)    Major depressive disorder, single episode, moderate degree (HCC)    Vitamin D deficiency    Exercise counseling    Nutritional  counseling    Left lower quadrant pain    Generalized abdominal pain    Heart murmur    Excessive and frequent menstruation with irregular cycle    Dysmenorrhea, unspecified    Primary insomnia    Alopecia areata    Attention deficit hyperactivity disorder, inattentive type    Depression    Costochondritis    Encounter for immunization    Asthma    Suspected COVID-19 virus infection    Chest tightness    Right foot pain    Hordeolum externum of left upper eyelid    Weight gain    Vitamin D insufficiency    Influenza vaccine administered    Gastroesophageal reflux disease without esophagitis    Viral infection, unspecified    Acute pain of right knee    Sorethroat    Epigastric pain    COVID-19 virus infection    Other fatigue    Absolute anemia    Injury of left knee    Patellofemoral disorder of left knee    Acute pain of left knee    Chronic right shoulder pain    Class 1 obesity due to excess calories with serious comorbidity and body mass index (BMI) of 30.0 to 30.9 in adult    Abnormal wellness exam    Right upper quadrant abdominal pain    TMJ dysfunction       Past Medical History:   Diagnosis Date    Acne     ADHD     Anxiety     Asthma     Concussion     X 2 IN 2017    Depression     Fracture of lumbar spine (HCC)     GERD (gastroesophageal reflux disease)     Hydronephrosis     Pertussis     Stress fracture     Vesicoureteral reflux        Past Surgical History:   Procedure Laterality Date    CYST REMOVAL      Left forearm    WISDOM TOOTH EXTRACTION         Family History   Problem Relation Age of Onset    Hypertension Mother     Heart murmur Mother     Depression Mother     Anxiety disorder Mother     Celiac disease Father     ADD / ADHD Father     Cancer Paternal Grandmother         adrenal gland    Celiac disease Paternal Grandfather     Anxiety disorder Sister     Depression Sister     ADD / ADHD Sister        Social History     Occupational History    Occupation: STUDENT   Tobacco Use    Smoking  status: Never    Smokeless tobacco: Former   Vaping Use    Vaping status: Former   Substance and Sexual Activity    Alcohol use: Never    Drug use: Never    Sexual activity: Not on file       Current Outpatient Medications on File Prior to Visit   Medication Sig    albuterol (PROVENTIL HFA,VENTOLIN HFA) 90 mcg/act inhaler TAKE 2 PUFFS BY MOUTH EVERY 6 HOURS AS NEEDED FOR WHEEZE    cyclobenzaprine (FLEXERIL) 5 mg tablet Take 1 tablet (5 mg total) by mouth daily at bedtime    diclofenac (VOLTAREN) 75 mg EC tablet TAKE 1 TABLET BY MOUTH TWICE A DAY    ergocalciferol (VITAMIN D2) 50,000 units TAKE 1 CAPSULE BY MOUTH ONE TIME PER WEEK    fluconazole (DIFLUCAN) 40 mg/mL suspension TAKE 3.8 ML (152 MG TOTAL) BY MOUTH ONE TIME FOR 1 DOSE.    fluticasone (FLONASE) 50 mcg/act nasal spray SPRAY 1 SPRAY INTO EACH NOSTRIL EVERY DAY    Glycopyrronium Tosylate 2.4 % PADS Apply topically    guaifenesin-codeine (GUAIFENESIN AC) 100-10 MG/5ML liquid Take 5 mL by mouth 3 (three) times a day as needed for cough    loratadine (CLARITIN) 10 mg tablet TAKE 1 TABLET BY MOUTH EVERY DAY    naproxen (NAPROSYN) 375 mg tablet Take 1 tablet (375 mg total) by mouth 2 (two) times a day with meals    nystatin (MYCOSTATIN) cream Apply 2 g (1 Application total) topically 2 (two) times a day To affected area    ondansetron (ZOFRAN-ODT) 4 mg disintegrating tablet Take 1 tablet (4 mg total) by mouth every 6 (six) hours as needed for nausea or vomiting    pantoprazole (PROTONIX) 40 mg tablet TAKE 1 TABLET BY MOUTH DAILY BEFORE BREAKFAST    tretinoin (RETIN-A) 0.025 % cream     amoxicillin (AMOXIL) 500 mg capsule TAKE 1 CAPSULE BY MOUTH TWICE A DAY FOR 10 DAYS (Patient not taking: Reported on 9/18/2023)    medroxyPROGESTERone acetate (DEPO-PROVERA SYRINGE) 150 mg/mL injection INJECT 150 MG INTRAMUSCULAR EVERY 12 WEEKS (Patient not taking: Reported on 5/16/2024)     No current facility-administered medications on file prior to visit.       Allergies  "  Allergen Reactions    Kiwi Extract - Food Allergy Shortness Of Breath    James Flavor - Food Allergy Itching       Physical Exam    /68   Ht 5' 1\" (1.549 m)   Wt 75.3 kg (166 lb)   BMI 31.37 kg/m²     Constitutional: normal, well developed, well nourished, alert, in no distress and non-toxic and no overt pain behavior.  Eyes: anicteric  HEENT: grossly intact  Neck: supple, symmetric, trachea midline and no masses   Pulmonary:even and unlabored  Cardiovascular:No edema or pitting edema present  Skin:Normal without rashes or lesions and well hydrated  Psychiatric:Mood and affect appropriate  Neurologic: Motor function is grossly intact with no focal neurologic deficits    Musculoskeletal: Pain elicited with lumbar extension and lateral flexion    Imaging  MRI lumbar spine wo contrast   FINDINGS:     VERTEBRAL BODIES:  There are 5 lumbar type vertebral bodies. Minimal levoscoliosis. Normal lordosis. Normal marrow signal is identified within the visualized bony structures.  No discrete marrow lesion.     SACRUM:  Normal signal within the sacrum. No evidence of insufficiency or stress fracture.     DISTAL CORD AND CONUS:  Normal size and signal within the distal cord and conus. The conus medullaris terminates at the L1 level.     PARASPINAL SOFT TISSUES:  Paraspinal soft tissues are unremarkable.     LOWER THORACIC DISC SPACES:  Normal disc height and signal.  No disc herniation, canal stenosis or foraminal narrowing.     LUMBAR DISC SPACES:     L1-L2:  Normal.     L2-L3:  Normal.     L3-L4:  Normal.     L4-L5:  Normal.     L5-S1:  Normal.     OTHER FINDINGS:  None.     IMPRESSION:     There is no focal disk herniation, central canal or neural foraminal narrowing.  "

## 2024-05-22 NOTE — TELEPHONE ENCOUNTER
Contacted patient in regards to [employer] inquiry for services received, IC received preferences to add patient to preferred waiting list.     Talk therapy  Diana Thompson-pt saw provider in 2022    Presenting problem-anxiety, depression, adhd

## 2024-07-08 ENCOUNTER — TELEPHONE (OUTPATIENT)
Dept: RADIOLOGY | Facility: MEDICAL CENTER | Age: 19
End: 2024-07-08

## 2024-07-08 NOTE — TELEPHONE ENCOUNTER
Patient did not show for her procedure with Dr. Jones today. Called and left her a message to call to reschedule.

## 2024-09-06 ENCOUNTER — OFFICE VISIT (OUTPATIENT)
Dept: FAMILY MEDICINE CLINIC | Facility: CLINIC | Age: 19
End: 2024-09-06
Payer: COMMERCIAL

## 2024-09-06 VITALS
WEIGHT: 166.2 LBS | RESPIRATION RATE: 16 BRPM | BODY MASS INDEX: 31.38 KG/M2 | TEMPERATURE: 99.4 F | HEIGHT: 61 IN | HEART RATE: 128 BPM | DIASTOLIC BLOOD PRESSURE: 82 MMHG | OXYGEN SATURATION: 98 % | SYSTOLIC BLOOD PRESSURE: 124 MMHG

## 2024-09-06 DIAGNOSIS — Z00.01 ABNORMAL WELLNESS EXAM: ICD-10-CM

## 2024-09-06 DIAGNOSIS — K21.9 GASTROESOPHAGEAL REFLUX DISEASE WITHOUT ESOPHAGITIS: ICD-10-CM

## 2024-09-06 DIAGNOSIS — T75.3XXA MOTION SICKNESS, INITIAL ENCOUNTER: ICD-10-CM

## 2024-09-06 DIAGNOSIS — F32.1 MAJOR DEPRESSIVE DISORDER, SINGLE EPISODE, MODERATE DEGREE (HCC): ICD-10-CM

## 2024-09-06 DIAGNOSIS — E66.09 CLASS 1 OBESITY DUE TO EXCESS CALORIES WITHOUT SERIOUS COMORBIDITY WITH BODY MASS INDEX (BMI) OF 31.0 TO 31.9 IN ADULT: Primary | ICD-10-CM

## 2024-09-06 DIAGNOSIS — F90.0 ATTENTION DEFICIT HYPERACTIVITY DISORDER, INATTENTIVE TYPE: ICD-10-CM

## 2024-09-06 PROCEDURE — 99395 PREV VISIT EST AGE 18-39: CPT | Performed by: FAMILY MEDICINE

## 2024-09-06 PROCEDURE — 99214 OFFICE O/P EST MOD 30 MIN: CPT | Performed by: FAMILY MEDICINE

## 2024-09-06 RX ORDER — NORGESTIMATE AND ETHINYL ESTRADIOL 0.25-0.035
KIT ORAL DAILY
COMMUNITY
Start: 2024-07-10

## 2024-09-06 NOTE — PROGRESS NOTES
Ambulatory Visit  Name: Richard Markham      : 2005      MRN: [id number]  Encounter Provider: Tomy Montoya MD  Encounter Date: 2024   Encounter department: [facility] [address] [facility]    Assessment & Plan   1. Class 1 obesity due to excess calories without serious comorbidity with body mass index (BMI) of 31.0 to 31.9 in adult  Assessment & Plan:  I am going to start her on Wegovy 0.25 mg weekly.  It was discussed with possible side effects.  We discussed about low-carb diet and regular exercise.  We will continue to monitor.  Orders:  -     Semaglutide-Weight Management (WEGOVY) 0.25 MG/0.5ML; Inject 0.5 mL (0.25 mg total) under the skin once a week for 28 days  2. Gastroesophageal reflux disease without esophagitis  Assessment & Plan:  Improved on pantoprazole.  Continue same.  We will continue to monitor.  3. Major depressive disorder, single episode, moderate degree (HCC)  Assessment & Plan:  Stable without medications.  Will continue to monitor.  4. Attention deficit hyperactivity disorder, inattentive type  Assessment & Plan:  Stable without medications.  5. Abnormal wellness exam  Assessment & Plan:  It was discussed about immunizations, diet, exercise and safety measures.  6. Motion sickness, initial encounter  Assessment & Plan:  She was given prescription for scopolamine patches.  Orders:  -     CBC and differential; Future  -     Comprehensive metabolic panel; Future  -     TSH, 3rd generation with Free T4 reflex; Future         History of Present Illness     She is here today for follow-up medical problems and for wellness exam.  She has been trying to watch her diet and exercise but has not lost weight.  She is asking for medication to help her with losing weight.  She is also complaining of having problems with motion sickness and asking for patches to help her with her symptoms.      Review of Systems   Constitutional:  Negative for chills and fever.   HENT:  Negative for  trouble swallowing.    Eyes:  Negative for visual disturbance.   Respiratory:  Negative for cough and shortness of breath.    Cardiovascular:  Negative for chest pain, palpitations and leg swelling.   Gastrointestinal:  Negative for abdominal pain, constipation and diarrhea.   Endocrine: Negative for cold intolerance and heat intolerance.   Genitourinary:  Negative for difficulty urinating and dysuria.   Musculoskeletal:  Negative for gait problem.   Skin:  Negative for rash.   Neurological:  Positive for dizziness. Negative for tremors, seizures and headaches.   Hematological:  Negative for adenopathy.   Psychiatric/Behavioral:  Negative for behavioral problems.      Past Medical History:   Diagnosis Date   • Acne    • ADHD    • Anxiety    • Asthma    • Concussion     X 2 IN 2017   • Depression    • Fracture of lumbar spine (HCC)    • GERD (gastroesophageal reflux disease)    • Hydronephrosis    • Pertussis    • Stress fracture    • Vesicoureteral reflux      Past Surgical History:   Procedure Laterality Date   • CYST REMOVAL      Left forearm   • WISDOM TOOTH EXTRACTION       Family History   Problem Relation Age of Onset   • Hypertension Mother    • Heart murmur Mother    • Depression Mother    • Anxiety disorder Mother    • Celiac disease Father    • ADD / ADHD Father    • Cancer Paternal Grandmother         adrenal gland   • Celiac disease Paternal Grandfather    • Anxiety disorder Sister    • Depression Sister    • ADD / ADHD Sister      Social History     Tobacco Use   • Smoking status: Never   • Smokeless tobacco: Former   Vaping Use   • Vaping status: Former   Substance and Sexual Activity   • Alcohol use: Never   • Drug use: Never   • Sexual activity: Not on file     Current Outpatient Medications on File Prior to Visit   Medication Sig   • albuterol (PROVENTIL HFA,VENTOLIN HFA) 90 mcg/act inhaler TAKE 2 PUFFS BY MOUTH EVERY 6 HOURS AS NEEDED FOR WHEEZE   • cyclobenzaprine (FLEXERIL) 5 mg tablet Take 1  tablet (5 mg total) by mouth daily at bedtime   • fluticasone (FLONASE) 50 mcg/act nasal spray SPRAY 1 SPRAY INTO EACH NOSTRIL EVERY DAY   • Glycopyrronium Tosylate 2.4 % PADS Apply topically   • loratadine (CLARITIN) 10 mg tablet TAKE 1 TABLET BY MOUTH EVERY DAY   • naproxen (NAPROSYN) 375 mg tablet Take 1 tablet (375 mg total) by mouth 2 (two) times a day with meals   • nystatin (MYCOSTATIN) cream Apply 2 g (1 Application total) topically 2 (two) times a day To affected area   • ondansetron (ZOFRAN-ODT) 4 mg disintegrating tablet Take 1 tablet (4 mg total) by mouth every 6 (six) hours as needed for nausea or vomiting   • pantoprazole (PROTONIX) 40 mg tablet TAKE 1 TABLET BY MOUTH DAILY BEFORE BREAKFAST   • tretinoin (RETIN-A) 0.025 % cream    • amoxicillin (AMOXIL) 500 mg capsule TAKE 1 CAPSULE BY MOUTH TWICE A DAY FOR 10 DAYS (Patient not taking: Reported on 9/18/2023)   • diclofenac (VOLTAREN) 75 mg EC tablet TAKE 1 TABLET BY MOUTH TWICE A DAY (Patient not taking: Reported on 9/6/2024)   • ergocalciferol (VITAMIN D2) 50,000 units TAKE 1 CAPSULE BY MOUTH ONE TIME PER WEEK (Patient not taking: Reported on 9/6/2024)   • fluconazole (DIFLUCAN) 40 mg/mL suspension TAKE 3.8 ML (152 MG TOTAL) BY MOUTH ONE TIME FOR 1 DOSE. (Patient not taking: Reported on 9/6/2024)   • guaifenesin-codeine (GUAIFENESIN AC) 100-10 MG/5ML liquid Take 5 mL by mouth 3 (three) times a day as needed for cough (Patient not taking: Reported on 9/6/2024)   • medroxyPROGESTERone acetate (DEPO-PROVERA SYRINGE) 150 mg/mL injection INJECT 150 MG INTRAMUSCULAR EVERY 12 WEEKS (Patient not taking: Reported on 5/16/2024)   • Sprintec 28 0.25-35 MG-MCG per tablet Take by mouth Daily (Patient not taking: Reported on 9/6/2024)     Allergies   Allergen Reactions   • Kiwi Extract - Food Allergy Shortness Of Breath   • [place] Flavor - Food Allergy Itching     Immunization History   Administered Date(s) Administered   • DTaP 2003, 02/19/2004, 05/19/2004,  "03/15/2005, 08/22/2008   • HPV Quadrivalent 08/20/2014, 11/04/2014   • HPV9 06/18/2019   • Hep B, Adolescent or Pediatric 2003, 2003, 05/19/2004   • Hepatitis A 08/30/2012, 08/23/2013   • HiB 2005, 2005, 02/19/2004, 11/22/2004   • Hib (PRP-T) 2005, 2003, 02/19/2004, 11/22/2004   • INFLUENZA 10/12/2004, 11/22/2004, 11/01/2006, 08/22/2008, 10/02/2009, 08/20/2010, 08/22/2011, 08/30/2012, 08/23/2013, 11/04/2014, 10/02/2015, 09/27/2016, 11/05/2017, 10/09/2018, 09/17/2019, 09/29/2020, 09/24/2021, 09/13/2023   • IPV 2005, 2003, 03/15/2005, 08/22/2008   • Influenza, injectable, quadrivalent, preservative free 0.5 mL 09/17/2019, 09/29/2020, 09/24/2021, 09/13/2023   • MMR 08/20/2004, 08/30/2007   • Meningococcal MCV4, Unspecified 08/20/2014, 03/17/2020   • Meningococcal MCV4P 08/20/2014, 03/17/2020   • Pneumococcal Conjugate PCV 7 2003, 2003, 02/19/2004, 11/22/2004   • Tdap 08/20/2014   • Tuberculin Skin Test-PPD Intradermal 02/25/2022, 09/13/2023   • Varicella 08/20/2004, 08/30/2007     Objective     /82 (BP Location: Left arm, Patient Position: Sitting, Cuff Size: Large)   Pulse (!) 128   Temp 99.4 °F (37.4 °C) (Tympanic)   Resp 16   Ht 5' 1\" (1.549 m)   Wt 75.4 kg (166 lb 3.2 oz)   SpO2 98%   BMI 31.40 kg/m²     Physical Exam  Vitals and nursing note reviewed.   Constitutional:       Appearance: She is well-developed.   HENT:      Head: Normocephalic and atraumatic.   Eyes:      Pupils: Pupils are equal, round, and reactive to light.   Cardiovascular:      Rate and Rhythm: Normal rate and regular rhythm.      Heart sounds: Normal heart sounds.   Pulmonary:      Effort: Pulmonary effort is normal.      Breath sounds: Normal breath sounds.   Abdominal:      General: Bowel sounds are normal.      Palpations: Abdomen is soft.   Musculoskeletal:      Cervical back: Normal range of motion and neck supple.   Lymphadenopathy:      Cervical: No cervical " adenopathy.   Skin:     General: Skin is warm.   Neurological:      Mental Status: She is alert and oriented to person, place, and time.     Depression Screening Follow-up Plan: Patient's depression screening was positive with a PHQ-2 score of . Their PHQ-9 score was 5. Patient assessed for underlying major depression. They have no active suicidal ideations. Brief counseling provided and recommend additional follow-up/re-evaluation next office visit.

## 2024-09-07 PROBLEM — T75.3XXA MOTION SICKNESS: Status: ACTIVE | Noted: 2024-09-07

## 2024-09-07 PROBLEM — F32.A DEPRESSION: Status: RESOLVED | Noted: 2020-02-05 | Resolved: 2024-09-07

## 2024-09-07 PROBLEM — F90.0 ATTENTION DEFICIT HYPERACTIVITY DISORDER, INATTENTIVE TYPE: Status: ACTIVE | Noted: 2019-04-02

## 2024-09-07 NOTE — ASSESSMENT & PLAN NOTE
I am going to start her on Wegovy 0.25 mg weekly.  It was discussed with possible side effects.  We discussed about low-carb diet and regular exercise.  We will continue to monitor.

## 2024-09-08 ENCOUNTER — TELEPHONE (OUTPATIENT)
Age: 19
End: 2024-09-08

## 2024-09-08 NOTE — TELEPHONE ENCOUNTER
PA for WEGOVY 0.25 MG/0.5ML SUBMITTED     via    []CMM-KEY:   [x]Rupinder-Case ID # [id number]   []Faxed to plan   []Other website   []Phone call Case ID #     Office notes sent, clinical questions answered. Awaiting determination    Turnaround time for your insurance to make a decision on your Prior Authorization can take 7-21 business days.

## 2024-09-16 NOTE — TELEPHONE ENCOUNTER
PA for WEGOVY APPROVED     Date(s) approved  September 9, 2024 to April 9, 2025    Case #    Patient advised by          [x][employer] Message  []Phone call   []LMOM  []L/M to call office as no active Communication consent on file  []Unable to leave detailed message as VM not approved on Communication consent       Pharmacy advised by    [x]Fax  []Phone call    Approval letter scanned into Media Yes

## 2024-10-09 NOTE — PROGRESS NOTES
Assessment Diagnoses and all orders for this visit:    Menorrhagia with irregular cycle  -     medroxyPROGESTERone acetate (DEPO-PROVERA SYRINGE) 150 mg/mL injection; Inject 1 mL (150 mg total) into a muscle every 3 (three) months    Dysmenorrhea  -     medroxyPROGESTERone acetate (DEPO-PROVERA SYRINGE) 150 mg/mL injection; Inject 1 mL (150 mg total) into a muscle every 3 (three) months    Encounter for prescription for depo-Provera    - will abstain from intercourse for 2 weeks  - urine pregnancy test when she comes in for Depo injection  - DepoProvera q 3 months    Plan  19 y.o. female with dysmenorrhea and menorrhagia.   Wants to restart DepoProvera .    Subjective      Richard Markham is a 19 y.o. female who presents for contraception counseling. The patient does not have complaints today. The patient is sexually active. With partner for 4 yrs.  Pertinent past medical history: none. Age 15-16 had heavy menses and was on Depo. She was not able to tolerate  OCP or swallow pill. She stopped the Depo in May. In July she started having a heavy period and it lasted  July 1 and July 31. She did well on the Depo Provera and would like to go back on DepoProvera. Richard is here with her mother and mom states that she had anemia as a teen until put on Depo. Had annual exam this summer. Will follow-up next year and have first pap smear. She had HPV - vaccine.     Patient Active Problem List   Diagnosis    REUBEN (generalized anxiety disorder)    Major depressive disorder, single episode, moderate degree (HCC)    Vitamin D deficiency    Exercise counseling    Nutritional counseling    Left lower quadrant pain    Generalized abdominal pain    Heart murmur    Excessive and frequent menstruation with irregular cycle    Dysmenorrhea, unspecified    Primary insomnia    Alopecia areata    Attention deficit hyperactivity disorder, inattentive type    Costochondritis    Encounter for immunization    Asthma    Suspected COVID-19 virus  infection    Chest tightness    Right foot pain    Hordeolum externum of left upper eyelid    Weight gain    Vitamin D insufficiency    Influenza vaccine administered    Gastroesophageal reflux disease without esophagitis    Viral infection, unspecified    Acute pain of right knee    Sorethroat    Epigastric pain    COVID-19 virus infection    Other fatigue    Absolute anemia    Injury of left knee    Patellofemoral disorder of left knee    Acute pain of left knee    Chronic right shoulder pain    Class 1 obesity due to excess calories without serious comorbidity with body mass index (BMI) of 31.0 to 31.9 in adult    Abnormal wellness exam    Right upper quadrant abdominal pain    TMJ dysfunction    Motion sickness       Past Medical History:   Diagnosis Date    Acne     ADHD     Anemia     Anxiety     Asthma     Concussion     X 2 IN 2017    Depression     Fracture of lumbar spine (HCC)     GERD (gastroesophageal reflux disease)     Hydronephrosis     Pertussis     Stress fracture     Vesicoureteral reflux        Past Surgical History:   Procedure Laterality Date    CYST REMOVAL      Left forearm    WISDOM TOOTH EXTRACTION         Family History   Problem Relation Age of Onset    Hypertension Mother     Heart murmur Mother     Depression Mother     Anxiety disorder Mother     Cancer Mother     Celiac disease Father     ADD / ADHD Father     Cancer Paternal Grandmother         adrenal gland    Celiac disease Paternal Grandfather     Anxiety disorder Sister     Depression Sister     ADD / ADHD Sister        Social History     Socioeconomic History    Marital status: Single     Spouse name: Not on file    Number of children: Not on file    Years of education: Not on file    Highest education level: Not on file   Occupational History    Occupation: STUDENT   Tobacco Use    Smoking status: Never    Smokeless tobacco: Never   Vaping Use    Vaping status: Former   Substance and Sexual Activity    Alcohol use: Yes      Comment: social    Drug use: Never    Sexual activity: Yes     Partners: Male     Birth control/protection: None   Other Topics Concern    Not on file   Social History Narrative    Not on file     Social Determinants of Health     Financial Resource Strain: Not on file   Food Insecurity: Not on file   Transportation Needs: No Transportation Needs (2/5/2020)    PRAPARE - Transportation     Lack of Transportation (Medical): No     Lack of Transportation (Non-Medical): No   Physical Activity: Insufficiently Active (2/5/2020)    Exercise Vital Sign     Days of Exercise per Week: 2 days     Minutes of Exercise per Session: 20 min   Stress: No Stress Concern Present (2/5/2020)    [place] [employer] - Occupational Stress Questionnaire     Feeling of Stress : Only a little   Social Connections: Unknown (2/5/2020)    Social Connection and Isolation Panel [NHANES]     Frequency of Communication with Friends and Family: More than three times a week     Frequency of Social Gatherings with Friends and Family: More than three times a week     Attends Jain Services: 1 to 4 times per year     Active Member of Clubs or Organizations: No     Attends Club or Organization Meetings: Never     Marital Status: Not on file   Intimate Partner Violence: Not At Risk (2/5/2020)    Humiliation, Afraid, Rape, and Kick questionnaire     Fear of Current or Ex-Partner: No     Emotionally Abused: No     Physically Abused: No     Sexually Abused: No   Housing Stability: Not on file          Current Outpatient Medications:     albuterol (PROVENTIL HFA,VENTOLIN HFA) 90 mcg/act inhaler, TAKE 2 PUFFS BY MOUTH EVERY 6 HOURS AS NEEDED FOR WHEEZE, Disp: 8.5 Inhaler, Rfl: 4    cyclobenzaprine (FLEXERIL) 5 mg tablet, Take 1 tablet (5 mg total) by mouth daily at bedtime, Disp: 30 tablet, Rfl: 1    fluticasone (FLONASE) 50 mcg/act nasal spray, SPRAY 1 SPRAY INTO EACH NOSTRIL EVERY DAY, Disp: 16 mL, Rfl: 1    Glycopyrronium Tosylate  2.4 % PADS, Apply topically, Disp: , Rfl:     loratadine (CLARITIN) 10 mg tablet, TAKE 1 TABLET BY MOUTH EVERY DAY, Disp: 30 tablet, Rfl: 0    medroxyPROGESTERone acetate (DEPO-PROVERA SYRINGE) 150 mg/mL injection, Inject 1 mL (150 mg total) into a muscle every 3 (three) months, Disp: 1 mL, Rfl: 4    naproxen (NAPROSYN) 375 mg tablet, Take 1 tablet (375 mg total) by mouth 2 (two) times a day with meals, Disp: 20 tablet, Rfl: 0    nystatin (MYCOSTATIN) cream, Apply 2 g (1 Application total) topically 2 (two) times a day To affected area, Disp: 30 g, Rfl: 0    ondansetron (ZOFRAN-ODT) 4 mg disintegrating tablet, Take 1 tablet (4 mg total) by mouth every 6 (six) hours as needed for nausea or vomiting, Disp: 20 tablet, Rfl: 0    pantoprazole (PROTONIX) 40 mg tablet, TAKE 1 TABLET BY MOUTH DAILY BEFORE BREAKFAST, Disp: 90 tablet, Rfl: 1    tretinoin (RETIN-A) 0.025 % cream, , Disp: , Rfl:     Allergies   Allergen Reactions    Kiwi Extract - Food Allergy Shortness Of Breath    [place] Flavor - Food Allergy Itching       Review of Systems  Constitutional :no fever, feels well, no tiredness, no recent weight gain or loss  ENT: no ear ache, no loss of hearing, no nosebleeds or nasal discharge, no sore throat or hoarseness.  Cardiovascular: no complaints of slow or fast heart beat, no chest pain, no palpitations, no leg claudication or lower extremity edema.  Respiratory: no complaints of shortness of shortness of breath, no ANTONY  Breasts:no complaints of breast pain, breast lump, or nipple discharge  Gastrointestinal: no complaints of abdominal pain, constipation, nausea, vomiting, or diarrhea or bloody stools  Genitourinary : no complaints of dysuria, incontinence, pelvic pain, no dysmenorrhea, vaginal discharge or abnormal vaginal bleeding and as noted in HPI.  Musculoskeletal: no complaints of arthralgia, no myalgia, no joint swelling or stiffness, no limb pain or swelling.  Integumentary: no complaints of skin rash or  "lesion, itching or dry skin  Neurological: no complaints of headache, no confusion, no numbness or tingling, no dizziness or fainting    Objective     /78   Ht 5' 1\" (1.549 m)   Wt 75.4 kg (166 lb 4.8 oz)   LMP 09/29/2024 (Exact Date)   BMI 31.42 kg/m²     General:   appears stated age, cooperative, alert normal mood and affect   Neck: normal, supple,trachea midline, no masses   Heart: regular rate and rhythm, S1, S2 normal, no murmur, click, rub or gallop   Lungs: clear to auscultation bilaterally   Skin normal skin turgor and no rashes.   Psychiatric orientation to person, place, and time: normal. mood and affect: normal        "

## 2024-10-10 ENCOUNTER — OFFICE VISIT (OUTPATIENT)
Dept: OBGYN CLINIC | Facility: MEDICAL CENTER | Age: 19
End: 2024-10-10
Payer: COMMERCIAL

## 2024-10-10 VITALS
BODY MASS INDEX: 31.4 KG/M2 | HEIGHT: 61 IN | SYSTOLIC BLOOD PRESSURE: 122 MMHG | DIASTOLIC BLOOD PRESSURE: 78 MMHG | WEIGHT: 166.3 LBS

## 2024-10-10 DIAGNOSIS — Z30.013 ENCOUNTER FOR PRESCRIPTION FOR DEPO-PROVERA: ICD-10-CM

## 2024-10-10 DIAGNOSIS — N92.1 MENORRHAGIA WITH IRREGULAR CYCLE: Primary | ICD-10-CM

## 2024-10-10 DIAGNOSIS — N94.6 DYSMENORRHEA: ICD-10-CM

## 2024-10-10 PROCEDURE — 99202 OFFICE O/P NEW SF 15 MIN: CPT | Performed by: ADVANCED PRACTICE MIDWIFE

## 2024-10-10 RX ORDER — MEDROXYPROGESTERONE ACETATE 150 MG/ML
150 INJECTION, SUSPENSION INTRAMUSCULAR
Qty: 1 ML | Refills: 4 | Status: SHIPPED | OUTPATIENT
Start: 2024-10-10

## 2024-10-17 ENCOUNTER — TELEPHONE (OUTPATIENT)
Age: 19
End: 2024-10-17

## 2024-10-24 ENCOUNTER — OFFICE VISIT (OUTPATIENT)
Dept: FAMILY MEDICINE CLINIC | Facility: CLINIC | Age: 19
End: 2024-10-24
Payer: COMMERCIAL

## 2024-10-24 ENCOUNTER — CLINICAL SUPPORT (OUTPATIENT)
Dept: OBGYN CLINIC | Facility: MEDICAL CENTER | Age: 19
End: 2024-10-24
Payer: COMMERCIAL

## 2024-10-24 VITALS
HEIGHT: 61 IN | DIASTOLIC BLOOD PRESSURE: 100 MMHG | BODY MASS INDEX: 31.15 KG/M2 | HEART RATE: 123 BPM | TEMPERATURE: 98.9 F | WEIGHT: 165 LBS | OXYGEN SATURATION: 99 % | SYSTOLIC BLOOD PRESSURE: 138 MMHG | RESPIRATION RATE: 16 BRPM

## 2024-10-24 DIAGNOSIS — J06.9 VIRAL URI WITH COUGH: Primary | ICD-10-CM

## 2024-10-24 DIAGNOSIS — Z30.42 ENCOUNTER FOR DEPO-PROVERA CONTRACEPTION: Primary | ICD-10-CM

## 2024-10-24 PROCEDURE — 99213 OFFICE O/P EST LOW 20 MIN: CPT | Performed by: NURSE PRACTITIONER

## 2024-10-24 PROCEDURE — 96372 THER/PROPH/DIAG INJ SC/IM: CPT

## 2024-10-24 RX ORDER — BENZONATATE 200 MG/1
200 CAPSULE ORAL 3 TIMES DAILY PRN
Qty: 20 CAPSULE | Refills: 0 | Status: SHIPPED | OUTPATIENT
Start: 2024-10-24

## 2024-10-24 RX ORDER — MEDROXYPROGESTERONE ACETATE 150 MG/ML
150 INJECTION, SUSPENSION INTRAMUSCULAR ONCE
Status: COMPLETED | OUTPATIENT
Start: 2024-10-24 | End: 2024-10-24

## 2024-10-24 RX ADMIN — MEDROXYPROGESTERONE ACETATE 150 MG: 150 INJECTION, SUSPENSION INTRAMUSCULAR at 14:08

## 2024-10-24 NOTE — PROGRESS NOTES
Ambulatory Visit  Name: Richard Markham      : 2005      MRN: [id number]  Encounter Provider: LUISA Rasmussen  Encounter Date: 10/24/2024   Encounter department: [facility]    Assessment & Plan  Viral URI with cough  - Prescription sent for tessalon perles as needed.  - Recommend Flonase.   - Discussed use of OTC Mucinex.   - Increase oral hydration and use humidifier.  - Contact office if symptoms do not improve in 7-10 days.   Orders:    benzonatate (TESSALON) 200 MG capsule; Take 1 capsule (200 mg total) by mouth 3 (three) times a day as needed for cough         History of Present Illness     Patient presents to office today with complaints of cough and congestion. Has been occurring since Monday. Cough started yesterday. Thought it was just related to allergies. Has been taking OTC Claritin which some improvement in her congestion. Had a sore throat as well on Monday but that has gone away. Denies any fever. Denies any other concerns or complaints today.         Cough  This is a new problem. The current episode started yesterday. The problem has been waxing and waning. The problem occurs every few hours. The cough is Productive of sputum. Associated symptoms include ear congestion, nasal congestion, postnasal drip, rhinorrhea and a sore throat. Pertinent negatives include no chest pain, chills, ear pain, fever, headaches, heartburn, hemoptysis, myalgias, rash, shortness of breath, sweats, weight loss or wheezing. Nothing aggravates the symptoms.     Review of Systems   Constitutional:  Negative for chills, fever and weight loss.   HENT:  Positive for postnasal drip, rhinorrhea and sore throat. Negative for ear pain.    Respiratory:  Positive for cough. Negative for hemoptysis, shortness of breath and wheezing.    Cardiovascular:  Negative for chest pain.   Gastrointestinal:  Negative for heartburn.   Musculoskeletal:  Negative for myalgias.   Skin:  Negative for rash.    Neurological:  Negative for headaches.     Past Medical History:   Diagnosis Date    Acne     ADHD     Anemia     Anxiety     Asthma     Concussion     X 2 IN 2017    Depression     Fracture of lumbar spine (HCC)     GERD (gastroesophageal reflux disease)     Hydronephrosis     Pertussis     Stress fracture     Vesicoureteral reflux      Past Surgical History:   Procedure Laterality Date    CYST REMOVAL      Left forearm    WISDOM TOOTH EXTRACTION       Family History   Problem Relation Age of Onset    Hypertension Mother     Heart murmur Mother     Depression Mother     Anxiety disorder Mother     Cancer Mother     Celiac disease Father     ADD / ADHD Father     Cancer Paternal Grandmother         adrenal gland    Celiac disease Paternal Grandfather     Anxiety disorder Sister     Depression Sister     ADD / ADHD Sister      Social History     Tobacco Use    Smoking status: Never    Smokeless tobacco: Never   Vaping Use    Vaping status: Former   Substance and Sexual Activity    Alcohol use: Yes     Comment: social    Drug use: Never    Sexual activity: Yes     Partners: Male     Birth control/protection: None     Current Outpatient Medications on File Prior to Visit   Medication Sig    albuterol (PROVENTIL HFA,VENTOLIN HFA) 90 mcg/act inhaler TAKE 2 PUFFS BY MOUTH EVERY 6 HOURS AS NEEDED FOR WHEEZE    cyclobenzaprine (FLEXERIL) 5 mg tablet Take 1 tablet (5 mg total) by mouth daily at bedtime    fluticasone (FLONASE) 50 mcg/act nasal spray SPRAY 1 SPRAY INTO EACH NOSTRIL EVERY DAY    Glycopyrronium Tosylate 2.4 % PADS Apply topically    loratadine (CLARITIN) 10 mg tablet TAKE 1 TABLET BY MOUTH EVERY DAY    medroxyPROGESTERone acetate (DEPO-PROVERA SYRINGE) 150 mg/mL injection Inject 1 mL (150 mg total) into a muscle every 3 (three) months    naproxen (NAPROSYN) 375 mg tablet Take 1 tablet (375 mg total) by mouth 2 (two) times a day with meals    nystatin (MYCOSTATIN) cream Apply 2 g (1 Application total)  "topically 2 (two) times a day To affected area    ondansetron (ZOFRAN-ODT) 4 mg disintegrating tablet Take 1 tablet (4 mg total) by mouth every 6 (six) hours as needed for nausea or vomiting    pantoprazole (PROTONIX) 40 mg tablet TAKE 1 TABLET BY MOUTH DAILY BEFORE BREAKFAST    tretinoin (RETIN-A) 0.025 % cream      Allergies   Allergen Reactions    Kiwi Extract - Food Allergy Shortness Of Breath    Woodruff Flavor - Food Allergy Itching     Immunization History   Administered Date(s) Administered    DTaP 2003, 02/19/2004, 05/19/2004, 03/15/2005, 08/22/2008    HPV Quadrivalent 08/20/2014, 11/04/2014    HPV9 06/18/2019    Hep B, Adolescent or Pediatric 2003, 2003, 05/19/2004    Hepatitis A 08/30/2012, 08/23/2013    HiB 2003, 2003, 02/19/2004, 11/22/2004    Hib (PRP-T) 2003, 2003, 02/19/2004, 11/22/2004    INFLUENZA 10/12/2004, 11/22/2004, 11/01/2006, 08/22/2008, 10/02/2009, 08/20/2010, 08/22/2011, 08/30/2012, 08/23/2013, 11/04/2014, 10/02/2015, 09/27/2016, 11/05/2017, 10/09/2018, 09/17/2019, 09/29/2020, 09/24/2021, 09/13/2023    IPV 2003, 2003, 03/15/2005, 08/22/2008    Influenza, injectable, quadrivalent, preservative free 0.5 mL 09/17/2019, 09/29/2020, 09/24/2021, 09/13/2023    MMR 08/20/2004, 08/30/2007    Meningococcal MCV4, Unspecified 08/20/2014, 03/17/2020    Meningococcal MCV4P 08/20/2014, 03/17/2020    Pneumococcal Conjugate PCV 7 2003, 2003, 02/19/2004, 11/22/2004    Tdap 08/20/2014    Tuberculin Skin Test-PPD Intradermal 02/25/2022, 09/13/2023    Varicella 08/20/2004, 08/30/2007     Objective     /100 (BP Location: Left arm, Patient Position: Sitting, Cuff Size: Standard)   Pulse (!) 123   Temp 98.9 °F (37.2 °C) (Tympanic)   Resp 16   Ht 5' 1\" (1.549 m)   Wt 74.8 kg (165 lb)   LMP 09/29/2024 (Exact Date)   SpO2 99%   BMI 31.18 kg/m²     Physical Exam  Vitals and nursing note reviewed.   Constitutional:       General: She is not " in acute distress.     Appearance: Normal appearance. She is well-developed.   HENT:      Head: Normocephalic and atraumatic.      Right Ear: Tympanic membrane, ear canal and external ear normal.      Left Ear: Tympanic membrane, ear canal and external ear normal.      Nose: Congestion present.      Mouth/Throat:      Mouth: Mucous membranes are moist.      Pharynx: No posterior oropharyngeal erythema.   Eyes:      Conjunctiva/sclera: Conjunctivae normal.   Cardiovascular:      Rate and Rhythm: Normal rate and regular rhythm.      Heart sounds: Normal heart sounds.   Pulmonary:      Effort: Pulmonary effort is normal.      Breath sounds: Normal breath sounds.   Musculoskeletal:         General: Normal range of motion.      Cervical back: Normal range of motion.   Skin:     General: Skin is warm and dry.   Neurological:      Mental Status: She is alert and oriented to person, place, and time.   Psychiatric:         Mood and Affect: Mood normal.         Behavior: Behavior normal.

## 2024-10-24 NOTE — PROGRESS NOTES
Patient presents for Depo-Provera injection.  Patient supplied medication.  Patient tolerated IM injection RUOQ.  Denied questions or concerns at conclusion of conversation.  Aware repeat injection is due in 3 months.  Encouraged to schedule prior to leaving office at today's visit.  UPT in office today Negative   Lot # [id number] NDC [id number] Expiration date 11/30/28

## 2024-10-24 NOTE — ASSESSMENT & PLAN NOTE
- Prescription sent for tessalon perles as needed.  - Recommend Flonase.   - Discussed use of OTC Mucinex.   - Increase oral hydration and use humidifier.  - Contact office if symptoms do not improve in 7-10 days.   Orders:    benzonatate (TESSALON) 200 MG capsule; Take 1 capsule (200 mg total) by mouth 3 (three) times a day as needed for cough

## 2024-10-30 ENCOUNTER — HOSPITAL ENCOUNTER (OUTPATIENT)
Dept: RADIOLOGY | Facility: IMAGING CENTER | Age: 19
Discharge: HOME | End: 2024-10-30
Payer: COMMERCIAL

## 2024-10-30 DIAGNOSIS — S93.491A SPRAIN OF OTHER LIGAMENT OF RIGHT ANKLE, INITIAL ENCOUNTER: ICD-10-CM

## 2024-10-30 PROCEDURE — 73721 MRI JNT OF LWR EXTRE W/O DYE: CPT

## 2024-11-08 ENCOUNTER — OFFICE VISIT (OUTPATIENT)
Dept: FAMILY MEDICINE CLINIC | Facility: CLINIC | Age: 19
End: 2024-11-08
Payer: COMMERCIAL

## 2024-11-08 VITALS
HEIGHT: 61 IN | OXYGEN SATURATION: 99 % | RESPIRATION RATE: 16 BRPM | SYSTOLIC BLOOD PRESSURE: 132 MMHG | DIASTOLIC BLOOD PRESSURE: 88 MMHG | TEMPERATURE: 98.6 F | HEART RATE: 117 BPM | BODY MASS INDEX: 30.58 KG/M2 | WEIGHT: 162 LBS

## 2024-11-08 DIAGNOSIS — E66.09 CLASS 1 OBESITY DUE TO EXCESS CALORIES WITHOUT SERIOUS COMORBIDITY WITH BODY MASS INDEX (BMI) OF 31.0 TO 31.9 IN ADULT: ICD-10-CM

## 2024-11-08 DIAGNOSIS — M79.671 RIGHT FOOT PAIN: Primary | ICD-10-CM

## 2024-11-08 DIAGNOSIS — F32.1 MAJOR DEPRESSIVE DISORDER, SINGLE EPISODE, MODERATE DEGREE (HCC): ICD-10-CM

## 2024-11-08 DIAGNOSIS — Z01.818 PRE-OP EXAMINATION: ICD-10-CM

## 2024-11-08 DIAGNOSIS — E66.811 CLASS 1 OBESITY DUE TO EXCESS CALORIES WITHOUT SERIOUS COMORBIDITY WITH BODY MASS INDEX (BMI) OF 31.0 TO 31.9 IN ADULT: ICD-10-CM

## 2024-11-08 DIAGNOSIS — K21.9 GASTROESOPHAGEAL REFLUX DISEASE WITHOUT ESOPHAGITIS: ICD-10-CM

## 2024-11-08 DIAGNOSIS — F90.0 ATTENTION DEFICIT HYPERACTIVITY DISORDER, INATTENTIVE TYPE: ICD-10-CM

## 2024-11-08 PROCEDURE — 99214 OFFICE O/P EST MOD 30 MIN: CPT | Performed by: FAMILY MEDICINE

## 2024-11-08 NOTE — ASSESSMENT & PLAN NOTE
Plan to start on Wegovy 0.25mg weekly after foot surgery. Discussed proper use and potential side effects. Follow up in 6 weeks. Discussed healthy diet and regular exercise. Will continue to monitor.     Orders:    Semaglutide-Weight Management (WEGOVY) 0.25 MG/0.5ML; Inject 0.5 mL (0.25 mg total) under the skin once a week for 28 days

## 2024-11-08 NOTE — PROGRESS NOTES
Ambulatory Visit  Name: Richard Markham      : 2005      MRN: [id number]  Encounter Provider: Tomy Montoya MD  Encounter Date: 2024   Encounter department: [facility]    Assessment & Plan  Right foot pain  Scheduled to have surgery coming up soon by Dr. Norton         Class 1 obesity due to excess calories without serious comorbidity with body mass index (BMI) of 31.0 to 31.9 in adult  Plan to start on Wegovy 0.25mg weekly after foot surgery. Discussed proper use and potential side effects. Follow up in 6 weeks. Discussed healthy diet and regular exercise. Will continue to monitor.     Orders:    Semaglutide-Weight Management (WEGOVY) 0.25 MG/0.5ML; Inject 0.5 mL (0.25 mg total) under the skin once a week for 28 days    Gastroesophageal reflux disease without esophagitis  Stable. Continue pantoprazole daily. Will continue to monitor.        Major depressive disorder, single episode, moderate degree (HCC)  Stable without any medications. Will continue to monitor.          Attention deficit hyperactivity disorder, inattentive type  Stable without any medications. Will continue to monitor.        Pre-op examination  She is an acceptable risk for the proposed procedure.            History of Present Illness     Patient presents to the office for routine follow up. She is having surgery later this month on her right foot to fix her tendons. She has no specific concerns for today's visit. She has not had recent blood work. She was unable to get Wegovy last visit because it was unavailable at the time so she never started it.         History obtained from : patient  Review of Systems   Constitutional:  Negative for chills and fever.   HENT:  Negative for ear pain and sore throat.    Eyes:  Negative for pain and visual disturbance.   Respiratory:  Negative for cough and shortness of breath.    Cardiovascular:  Negative for chest pain and palpitations.   Gastrointestinal:  Negative  "for abdominal pain and vomiting.   Genitourinary:  Negative for dysuria and hematuria.   Musculoskeletal:  Negative for arthralgias and back pain.   Skin:  Negative for color change and rash.   Neurological:  Negative for seizures and syncope.         Objective     /88 (BP Location: Left arm, Patient Position: Sitting, Cuff Size: Standard)   Pulse (!) 117   Temp 98.6 °F (37 °C) (Tympanic)   Resp 16   Ht 5' 1\" (1.549 m)   Wt 73.5 kg (162 lb)   LMP 09/29/2024 (Exact Date)   SpO2 99%   BMI 30.61 kg/m²     Physical Exam  Vitals and nursing note reviewed.   Constitutional:       General: She is not in acute distress.     Appearance: She is well-developed.   HENT:      Head: Normocephalic and atraumatic.   Eyes:      Conjunctiva/sclera: Conjunctivae normal.   Cardiovascular:      Rate and Rhythm: Normal rate and regular rhythm.      Heart sounds: No murmur heard.  Pulmonary:      Effort: Pulmonary effort is normal. No respiratory distress.      Breath sounds: Normal breath sounds.   Musculoskeletal:         General: No swelling.      Comments: + boot on right foot   Skin:     General: Skin is warm and dry.   Neurological:      Mental Status: She is alert.   Psychiatric:         Mood and Affect: Mood normal.       "

## 2024-11-12 DIAGNOSIS — E66.811 CLASS 1 OBESITY DUE TO EXCESS CALORIES WITHOUT SERIOUS COMORBIDITY WITH BODY MASS INDEX (BMI) OF 31.0 TO 31.9 IN ADULT: ICD-10-CM

## 2024-11-12 DIAGNOSIS — E66.09 CLASS 1 OBESITY DUE TO EXCESS CALORIES WITHOUT SERIOUS COMORBIDITY WITH BODY MASS INDEX (BMI) OF 31.0 TO 31.9 IN ADULT: ICD-10-CM

## 2024-11-19 ENCOUNTER — APPOINTMENT (OUTPATIENT)
Dept: LAB | Facility: IMAGING CENTER | Age: 19
End: 2024-11-19
Payer: COMMERCIAL

## 2024-11-19 DIAGNOSIS — T75.3XXA MOTION SICKNESS, INITIAL ENCOUNTER: ICD-10-CM

## 2024-11-19 LAB
ALBUMIN SERPL BCG-MCNC: 4.5 G/DL (ref 3.5–5)
ALP SERPL-CCNC: 71 U/L (ref 34–104)
ALT SERPL W P-5'-P-CCNC: 21 U/L (ref 7–52)
ANION GAP SERPL CALCULATED.3IONS-SCNC: 8 MMOL/L (ref 4–13)
AST SERPL W P-5'-P-CCNC: 18 U/L (ref 13–39)
BASOPHILS # BLD AUTO: 0.04 THOUSANDS/ÂΜL (ref 0–0.1)
BASOPHILS NFR BLD AUTO: 1 % (ref 0–1)
BILIRUB SERPL-MCNC: 0.41 MG/DL (ref 0.2–1)
BUN SERPL-MCNC: 12 MG/DL (ref 5–25)
CALCIUM SERPL-MCNC: 9.4 MG/DL (ref 8.4–10.2)
CHLORIDE SERPL-SCNC: 106 MMOL/L (ref 96–108)
CO2 SERPL-SCNC: 26 MMOL/L (ref 21–32)
CREAT SERPL-MCNC: 0.84 MG/DL (ref 0.6–1.3)
EOSINOPHIL # BLD AUTO: 0.3 THOUSAND/ÂΜL (ref 0–0.61)
EOSINOPHIL NFR BLD AUTO: 6 % (ref 0–6)
ERYTHROCYTE [DISTWIDTH] IN BLOOD BY AUTOMATED COUNT: 12.6 % (ref 11.6–15.1)
GFR SERPL CREATININE-BSD FRML MDRD: 99 ML/MIN/1.73SQ M
GLUCOSE P FAST SERPL-MCNC: 94 MG/DL (ref 65–99)
HCT VFR BLD AUTO: 40.6 % (ref 34.8–46.1)
HGB BLD-MCNC: 13.2 G/DL (ref 11.5–15.4)
IMM GRANULOCYTES # BLD AUTO: 0.01 THOUSAND/UL (ref 0–0.2)
IMM GRANULOCYTES NFR BLD AUTO: 0 % (ref 0–2)
LYMPHOCYTES # BLD AUTO: 1.6 THOUSANDS/ÂΜL (ref 0.6–4.47)
LYMPHOCYTES NFR BLD AUTO: 29 % (ref 14–44)
MCH RBC QN AUTO: 28.5 PG (ref 26.8–34.3)
MCHC RBC AUTO-ENTMCNC: 32.5 G/DL (ref 31.4–37.4)
MCV RBC AUTO: 88 FL (ref 82–98)
MONOCYTES # BLD AUTO: 0.32 THOUSAND/ÂΜL (ref 0.17–1.22)
MONOCYTES NFR BLD AUTO: 6 % (ref 4–12)
NEUTROPHILS # BLD AUTO: 3.21 THOUSANDS/ÂΜL (ref 1.85–7.62)
NEUTS SEG NFR BLD AUTO: 58 % (ref 43–75)
NRBC BLD AUTO-RTO: 0 /100 WBCS
PLATELET # BLD AUTO: 293 THOUSANDS/UL (ref 149–390)
PMV BLD AUTO: 10.3 FL (ref 8.9–12.7)
POTASSIUM SERPL-SCNC: 4.4 MMOL/L (ref 3.5–5.3)
PROT SERPL-MCNC: 7.4 G/DL (ref 6.4–8.4)
RBC # BLD AUTO: 4.63 MILLION/UL (ref 3.81–5.12)
SODIUM SERPL-SCNC: 140 MMOL/L (ref 135–147)
TSH SERPL DL<=0.05 MIU/L-ACNC: 1.46 UIU/ML (ref 0.45–4.5)
WBC # BLD AUTO: 5.48 THOUSAND/UL (ref 4.31–10.16)

## 2024-11-19 PROCEDURE — 85025 COMPLETE CBC W/AUTO DIFF WBC: CPT

## 2024-11-19 PROCEDURE — 80053 COMPREHEN METABOLIC PANEL: CPT

## 2024-11-19 PROCEDURE — 84443 ASSAY THYROID STIM HORMONE: CPT

## 2024-11-19 PROCEDURE — 36415 COLL VENOUS BLD VENIPUNCTURE: CPT

## 2024-11-19 NOTE — PRE-PROCEDURE INSTRUCTIONS
Pre-Surgery Instructions:   Medication Instructions    albuterol (PROVENTIL HFA,VENTOLIN HFA) 90 mcg/act inhaler Uses PRN- OK to take day of surgery    benzonatate (TESSALON) 200 MG capsule Uses PRN- OK to take day of surgery    cyclobenzaprine (FLEXERIL) 5 mg tablet Take night before surgery    fluticasone (FLONASE) 50 mcg/act nasal spray Uses PRN- OK to take day of surgery    Glycopyrronium Tosylate 2.4 % PADS Hold day of surgery.    loratadine (CLARITIN) 10 mg tablet Uses PRN- OK to take day of surgery    medroxyPROGESTERone acetate (DEPO-PROVERA SYRINGE) 150 mg/mL injection Every three months    naproxen (NAPROSYN) 375 mg tablet Stop taking 7 days prior to surgery.    nystatin (MYCOSTATIN) cream Hold day of surgery.    ondansetron (ZOFRAN-ODT) 4 mg disintegrating tablet Uses PRN- OK to take day of surgery    pantoprazole (PROTONIX) 40 mg tablet Take day of surgery.    tretinoin (RETIN-A) 0.025 % cream Hold day of surgery.        Medication instructions for day surgery reviewed. Please use only a sip of water to take your instructed medications. Avoid all over the counter vitamins, supplements and NSAIDS for one week prior to surgery per anesthesia guidelines. Tylenol is ok to take as needed.     You will receive a call one business day prior to surgery with an arrival time and hospital directions. If your surgery is scheduled on a Monday, the hospital will be calling you on the Friday prior to your surgery. If you have not heard from anyone by 8pm, please call the hospital supervisor through the hospital  at [phone]. ([place] [phone] or [place] [phone]).    Do not eat or drink anything after midnight the night before your surgery, including candy, mints, lifesavers, or chewing gum. Do not drink alcohol 24hrs before your surgery. Try not to smoke at least 24hrs before your surgery.       Follow the pre surgery showering instructions as listed in the “My Surgical Experience Booklet” or  otherwise provided by your surgeon's office. Do not use a blade to shave the surgical area 1 week before surgery. It is okay to use a clean electric clippers up to 24 hours before surgery. Do not apply any lotions, creams, including makeup, cologne, deodorant, or perfumes after showering on the day of your surgery. Do not use dry shampoo, hair spray, hair gel, or any type of hair products.     No contact lenses, eye make-up, or artificial eyelashes. Remove nail polish, including gel polish, and any artificial, gel, or acrylic nails if possible. Remove all jewelry including rings and body piercing jewelry.     Wear causal clothing that is easy to take on and off. Consider your type of surgery.    Keep any valuables, jewelry, piercings at home. Please bring any specially ordered equipment (sling, braces) if indicated.    Arrange for a responsible person to drive you to and from the hospital on the day of your surgery. Please confirm the visitor policy for the day of your procedure when you receive your phone call with an arrival time.     Call the surgeon's office with any new illnesses, exposures, or additional questions prior to surgery.    Please reference your “My Surgical Experience Booklet” for additional information to prepare for your upcoming surgery.

## 2024-11-21 ENCOUNTER — RESULTS FOLLOW-UP (OUTPATIENT)
Dept: FAMILY MEDICINE CLINIC | Facility: CLINIC | Age: 19
End: 2024-11-21

## 2024-11-21 NOTE — TELEPHONE ENCOUNTER
----- Message from Tomy Montoya MD sent at 11/21/2024 12:43 PM EST -----  Her blood work came back normal.

## 2024-11-23 PROBLEM — J06.9 VIRAL URI WITH COUGH: Status: RESOLVED | Noted: 2024-10-24 | Resolved: 2024-11-23

## 2024-11-25 ENCOUNTER — OFFICE VISIT (OUTPATIENT)
Dept: FAMILY MEDICINE CLINIC | Facility: CLINIC | Age: 19
End: 2024-11-25
Payer: COMMERCIAL

## 2024-11-25 ENCOUNTER — ANESTHESIA EVENT (OUTPATIENT)
Dept: PERIOP | Facility: HOSPITAL | Age: 19
End: 2024-11-25
Payer: COMMERCIAL

## 2024-11-25 VITALS
SYSTOLIC BLOOD PRESSURE: 138 MMHG | HEIGHT: 61 IN | RESPIRATION RATE: 16 BRPM | DIASTOLIC BLOOD PRESSURE: 70 MMHG | OXYGEN SATURATION: 98 % | HEART RATE: 128 BPM | TEMPERATURE: 99.1 F | BODY MASS INDEX: 30.58 KG/M2 | WEIGHT: 162 LBS

## 2024-11-25 DIAGNOSIS — J45.909 ASTHMA, UNSPECIFIED ASTHMA SEVERITY, UNSPECIFIED WHETHER COMPLICATED, UNSPECIFIED WHETHER PERSISTENT: ICD-10-CM

## 2024-11-25 DIAGNOSIS — R10.84 GENERALIZED ABDOMINAL PAIN: Primary | ICD-10-CM

## 2024-11-25 PROCEDURE — 99213 OFFICE O/P EST LOW 20 MIN: CPT | Performed by: FAMILY MEDICINE

## 2024-11-25 NOTE — ASSESSMENT & PLAN NOTE
Patient has prescription of Zofran at home she has not been using. Her symptoms continue to improve so advised to take Zofran as needed for nausea. Encouraged hydration and bland foods. Follow up if not improving.

## 2024-11-25 NOTE — PROGRESS NOTES
Name: Richard Markham      : 2005      MRN: [id number]  Encounter Provider: Tomy Montoya MD  Encounter Date: 2024   Encounter department: [facility]  :  Assessment & Plan  Generalized abdominal pain  Patient has prescription of Zofran at home she has not been using. Her symptoms continue to improve so advised to take Zofran as needed for nausea. Encouraged hydration and bland foods. Follow up if not improving.        Asthma, unspecified asthma severity, unspecified whether complicated, unspecified whether persistent  Stable.  Continue same.  We will continue to monitor.                History of Present Illness     Patient presents to the office for nausea/abdominal pain and diarrhea that began yesterday. She states she had diarrhea for the whole day but this has been improving. She reports she had a non-bloody, non-bilious stool this morning with no diarrhea. She also has continuous nausea and some generalized abdominal discomfort. She has not been using anything specific for these symptoms. She denies any known sick contacts.     Abdominal Pain  This is a new problem. The current episode started yesterday. The onset quality is sudden. The problem occurs constantly. The problem has been waxing and waning since onset. The pain is located in the generalized abdominal region. The pain is at a severity of 7/10. The quality of the pain is described as cramping. Associated symptoms include belching, diarrhea, flatus and nausea. Pertinent negatives include no anorexia, arthralgias, constipation, dysuria, fever, frequency, headaches, hematochezia, hematuria, melena, myalgias or vomiting. The symptoms are relieved by belching, palpation and passing flatus.     Review of Systems   Constitutional:  Negative for chills and fever.   HENT:  Negative for congestion.    Respiratory:  Negative for shortness of breath.    Cardiovascular:  Negative for chest pain.   Gastrointestinal:   "Positive for abdominal pain, diarrhea, flatus and nausea. Negative for anorexia, blood in stool, constipation, hematochezia, melena and vomiting.   Genitourinary:  Negative for dysuria, frequency and hematuria.   Musculoskeletal:  Negative for arthralgias and myalgias.   Neurological:  Negative for headaches.          Objective   /70 (BP Location: Left arm, Patient Position: Sitting, Cuff Size: Standard)   Pulse (!) 128   Temp 99.1 °F (37.3 °C) (Tympanic)   Resp 16   Ht 5' 1\" (1.549 m)   Wt 73.5 kg (162 lb)   SpO2 98%   BMI 30.61 kg/m²      Physical Exam  Vitals and nursing note reviewed.   Constitutional:       General: She is not in acute distress.     Appearance: She is well-developed.   HENT:      Head: Normocephalic and atraumatic.   Eyes:      Conjunctiva/sclera: Conjunctivae normal.   Cardiovascular:      Rate and Rhythm: Normal rate and regular rhythm.      Heart sounds: No murmur heard.  Pulmonary:      Effort: Pulmonary effort is normal. No respiratory distress.      Breath sounds: Normal breath sounds.   Abdominal:      Palpations: Abdomen is soft.      Tenderness: There is no abdominal tenderness.      Comments: Mild tenderness generalized across lower abdomen    Musculoskeletal:         General: No swelling.      Cervical back: Neck supple.   Skin:     General: Skin is warm and dry.      Capillary Refill: Capillary refill takes less than 2 seconds.   Neurological:      Mental Status: She is alert.   Psychiatric:         Mood and Affect: Mood normal.       "

## 2024-11-27 ENCOUNTER — HOSPITAL ENCOUNTER (OUTPATIENT)
Facility: HOSPITAL | Age: 19
Setting detail: OUTPATIENT SURGERY
Discharge: HOME | End: 2024-11-27
Attending: PODIATRIST | Admitting: PODIATRIST
Payer: COMMERCIAL

## 2024-11-27 ENCOUNTER — APPOINTMENT (OUTPATIENT)
Dept: RADIOLOGY | Facility: HOSPITAL | Age: 19
End: 2024-11-27
Payer: COMMERCIAL

## 2024-11-27 ENCOUNTER — ANESTHESIA (OUTPATIENT)
Dept: PERIOP | Facility: HOSPITAL | Age: 19
End: 2024-11-27
Payer: COMMERCIAL

## 2024-11-27 VITALS
TEMPERATURE: 97.4 F | HEART RATE: 80 BPM | DIASTOLIC BLOOD PRESSURE: 58 MMHG | OXYGEN SATURATION: 100 % | RESPIRATION RATE: 18 BRPM | SYSTOLIC BLOOD PRESSURE: 114 MMHG

## 2024-11-27 DIAGNOSIS — Z98.890 POST-OPERATIVE STATE: Primary | ICD-10-CM

## 2024-11-27 LAB
EXT PREGNANCY TEST URINE: NEGATIVE
EXT. CONTROL: NORMAL

## 2024-11-27 PROCEDURE — 73600 X-RAY EXAM OF ANKLE: CPT

## 2024-11-27 PROCEDURE — 81025 URINE PREGNANCY TEST: CPT | Performed by: PODIATRIST

## 2024-11-27 PROCEDURE — C9290 INJ, BUPIVACAINE LIPOSOME: HCPCS | Performed by: ANESTHESIOLOGY

## 2024-11-27 PROCEDURE — 73610 X-RAY EXAM OF ANKLE: CPT

## 2024-11-27 PROCEDURE — C1713 ANCHOR/SCREW BN/BN,TIS/BN: HCPCS | Performed by: PODIATRIST

## 2024-11-27 DEVICE — SONICANCHOR KIT
Type: IMPLANTABLE DEVICE | Site: ANKLE | Status: FUNCTIONAL
Brand: SONICANCHOR

## 2024-11-27 RX ORDER — OXYCODONE AND ACETAMINOPHEN 5; 325 MG/1; MG/1
1 TABLET ORAL EVERY 4 HOURS PRN
Refills: 0 | Status: DISCONTINUED | OUTPATIENT
Start: 2024-11-27 | End: 2024-11-27 | Stop reason: HOSPADM

## 2024-11-27 RX ORDER — CHLORHEXIDINE GLUCONATE ORAL RINSE 1.2 MG/ML
15 SOLUTION DENTAL ONCE
Status: COMPLETED | OUTPATIENT
Start: 2024-11-27 | End: 2024-11-27

## 2024-11-27 RX ORDER — LIDOCAINE HYDROCHLORIDE 10 MG/ML
INJECTION, SOLUTION EPIDURAL; INFILTRATION; INTRACAUDAL; PERINEURAL AS NEEDED
Status: DISCONTINUED | OUTPATIENT
Start: 2024-11-27 | End: 2024-11-27

## 2024-11-27 RX ORDER — CEFAZOLIN SODIUM 2 G/50ML
2000 SOLUTION INTRAVENOUS ONCE
Status: COMPLETED | OUTPATIENT
Start: 2024-11-27 | End: 2024-11-27

## 2024-11-27 RX ORDER — ONDANSETRON 2 MG/ML
4 INJECTION INTRAMUSCULAR; INTRAVENOUS ONCE AS NEEDED
Status: DISCONTINUED | OUTPATIENT
Start: 2024-11-27 | End: 2024-11-27 | Stop reason: HOSPADM

## 2024-11-27 RX ORDER — BUPIVACAINE HYDROCHLORIDE 5 MG/ML
INJECTION, SOLUTION EPIDURAL; INTRACAUDAL AS NEEDED
Status: DISCONTINUED | OUTPATIENT
Start: 2024-11-27 | End: 2024-11-27

## 2024-11-27 RX ORDER — METOCLOPRAMIDE HYDROCHLORIDE 5 MG/ML
10 INJECTION INTRAMUSCULAR; INTRAVENOUS ONCE AS NEEDED
Status: DISCONTINUED | OUTPATIENT
Start: 2024-11-27 | End: 2024-11-27 | Stop reason: HOSPADM

## 2024-11-27 RX ORDER — ONDANSETRON 2 MG/ML
INJECTION INTRAMUSCULAR; INTRAVENOUS AS NEEDED
Status: DISCONTINUED | OUTPATIENT
Start: 2024-11-27 | End: 2024-11-27

## 2024-11-27 RX ORDER — MIDAZOLAM HYDROCHLORIDE 2 MG/2ML
INJECTION, SOLUTION INTRAMUSCULAR; INTRAVENOUS AS NEEDED
Status: DISCONTINUED | OUTPATIENT
Start: 2024-11-27 | End: 2024-11-27

## 2024-11-27 RX ORDER — DEXAMETHASONE SODIUM PHOSPHATE 10 MG/ML
INJECTION, SOLUTION INTRAMUSCULAR; INTRAVENOUS AS NEEDED
Status: DISCONTINUED | OUTPATIENT
Start: 2024-11-27 | End: 2024-11-27

## 2024-11-27 RX ORDER — HYDROMORPHONE HCL/PF 1 MG/ML
0.4 SYRINGE (ML) INJECTION
Status: DISCONTINUED | OUTPATIENT
Start: 2024-11-27 | End: 2024-11-27 | Stop reason: HOSPADM

## 2024-11-27 RX ORDER — SODIUM CHLORIDE, SODIUM LACTATE, POTASSIUM CHLORIDE, CALCIUM CHLORIDE 600; 310; 30; 20 MG/100ML; MG/100ML; MG/100ML; MG/100ML
INJECTION, SOLUTION INTRAVENOUS CONTINUOUS PRN
Status: DISCONTINUED | OUTPATIENT
Start: 2024-11-27 | End: 2024-11-27

## 2024-11-27 RX ORDER — FENTANYL CITRATE/PF 50 MCG/ML
50 SYRINGE (ML) INJECTION
Status: DISCONTINUED | OUTPATIENT
Start: 2024-11-27 | End: 2024-11-27 | Stop reason: HOSPADM

## 2024-11-27 RX ORDER — PROPOFOL 10 MG/ML
INJECTION, EMULSION INTRAVENOUS CONTINUOUS PRN
Status: DISCONTINUED | OUTPATIENT
Start: 2024-11-27 | End: 2024-11-27

## 2024-11-27 RX ORDER — MAGNESIUM HYDROXIDE 1200 MG/15ML
LIQUID ORAL AS NEEDED
Status: DISCONTINUED | OUTPATIENT
Start: 2024-11-27 | End: 2024-11-27 | Stop reason: HOSPADM

## 2024-11-27 RX ORDER — PROPOFOL 10 MG/ML
INJECTION, EMULSION INTRAVENOUS AS NEEDED
Status: DISCONTINUED | OUTPATIENT
Start: 2024-11-27 | End: 2024-11-27

## 2024-11-27 RX ORDER — FENTANYL CITRATE 50 UG/ML
INJECTION, SOLUTION INTRAMUSCULAR; INTRAVENOUS AS NEEDED
Status: DISCONTINUED | OUTPATIENT
Start: 2024-11-27 | End: 2024-11-27

## 2024-11-27 RX ADMIN — DEXAMETHASONE SODIUM PHOSPHATE 10 MG: 10 INJECTION, SOLUTION INTRAMUSCULAR; INTRAVENOUS at 13:22

## 2024-11-27 RX ADMIN — PROPOFOL 100 MCG/KG/MIN: 10 INJECTION, EMULSION INTRAVENOUS at 12:30

## 2024-11-27 RX ADMIN — FENTANYL CITRATE 25 MCG: 50 INJECTION, SOLUTION INTRAMUSCULAR; INTRAVENOUS at 13:22

## 2024-11-27 RX ADMIN — DEXMEDETOMIDINE HYDROCHLORIDE 12 MCG: 100 INJECTION, SOLUTION INTRAVENOUS at 12:23

## 2024-11-27 RX ADMIN — BUPIVACAINE 20 ML: 13.3 INJECTION, SUSPENSION, LIPOSOMAL INFILTRATION at 12:05

## 2024-11-27 RX ADMIN — CHLORHEXIDINE GLUCONATE 0.12% ORAL RINSE 15 ML: 1.2 LIQUID ORAL at 09:54

## 2024-11-27 RX ADMIN — DEXMEDETOMIDINE HYDROCHLORIDE 8 MCG: 100 INJECTION, SOLUTION INTRAVENOUS at 13:22

## 2024-11-27 RX ADMIN — SODIUM CHLORIDE, SODIUM LACTATE, POTASSIUM CHLORIDE, AND CALCIUM CHLORIDE: .6; .31; .03; .02 INJECTION, SOLUTION INTRAVENOUS at 12:16

## 2024-11-27 RX ADMIN — OXYCODONE HYDROCHLORIDE AND ACETAMINOPHEN 1 TABLET: 5; 325 TABLET ORAL at 14:34

## 2024-11-27 RX ADMIN — CEFAZOLIN SODIUM 2000 MG: 2 SOLUTION INTRAVENOUS at 12:30

## 2024-11-27 RX ADMIN — MIDAZOLAM 2 MG: 1 INJECTION INTRAMUSCULAR; INTRAVENOUS at 12:00

## 2024-11-27 RX ADMIN — PROPOFOL 200 MG: 10 INJECTION, EMULSION INTRAVENOUS at 12:23

## 2024-11-27 RX ADMIN — FENTANYL CITRATE 25 MCG: 50 INJECTION, SOLUTION INTRAMUSCULAR; INTRAVENOUS at 12:24

## 2024-11-27 RX ADMIN — ONDANSETRON 4 MG: 2 INJECTION INTRAMUSCULAR; INTRAVENOUS at 13:23

## 2024-11-27 RX ADMIN — BUPIVACAINE HYDROCHLORIDE 10 ML: 5 INJECTION, SOLUTION EPIDURAL; INTRACAUDAL; PERINEURAL at 12:05

## 2024-11-27 RX ADMIN — LIDOCAINE HYDROCHLORIDE 50 MG: 10 SOLUTION INTRAVENOUS at 12:23

## 2024-11-27 RX ADMIN — FENTANYL CITRATE 50 MCG: 50 INJECTION, SOLUTION INTRAMUSCULAR; INTRAVENOUS at 12:23

## 2024-11-27 NOTE — DISCHARGE INSTR - AVS FIRST PAGE
Dr. Joshua Bledsoe, DPM  Post-Operative Instructions    1. Take your prescribed medication as directed.   2. Upon arrival at home, lie down and elevate your surgical foot on 2 pillows.  3. Stay off your feet as much as possible for the first 24-48 hours. This is when your feet first swell and may become painful. After 48 hours you may begin limited walking following these restrictions:   Nonweightbearing to surgical foot  4. Drink large quantities of water. Consume no alcohol. Continue a well-balanced diet.  5. Report any unusual discomfort or fever to this office.  6. A limited amount of discomfort and swelling is to be expected. In some cases the skin may take on a bruised appearance. The surgical cleansing solution that was applied to your foot prior to the operation is dark in color and the operation site may appear to be oozing when it actually is not.  7. A slight amount of blood is to be expected, and is no cause for alarm. Do not remove the dressings. If there is active bleeding and if the bleeding persists, add additional gauze to the bandage, apply direct pressure, elevate your feet and call this office.  8. Do not get the dressings wet. As regular bathing may be inconvenient, sponge baths are recommended.   9. When anesthesia wears off and if any discomfort should be present, apply an ice pack directly over the operated area for 15 minute intervals for several hours or until the pain leaves. (USE IN EXCESS OF 15 MINUTES COULD CAUSE FROSTBITE). Do not use hot water bags or electric pads. A convenient icepack can be made by placing ice cubes in a plastic bag and covering this with a towel.  10. Take over-the-counter laxative for constipation, this is common with use of narcotic medications.

## 2024-11-27 NOTE — ANESTHESIA PROCEDURE NOTES
Peripheral Block    Patient location during procedure: holding area  Start time: 11/27/2024 12:05 PM  Reason for block: at surgeon's request and post-op pain management  Staffing  Performed by: Aaliyah Guevara MD  Authorized by: Aaliyah Guevara MD    Preanesthetic Checklist  Completed: patient identified, IV checked, site marked, risks and benefits discussed, surgical consent, monitors and equipment checked, pre-op evaluation and timeout performed  Peripheral Block  Patient position: supine  Prep: ChloraPrep  Patient monitoring: frequent blood pressure checks, continuous pulse oximetry and heart rate  Block type: Popliteal  Laterality: right  Injection technique: single-shot  Procedures: ultrasound guided, Ultrasound guidance required for the procedure to increase accuracy and safety of medication placement and decrease risk of complications.  Ultrasound permanent image saved    Needle  Needle type: Stimuplex   Needle gauge: 20 G  Needle length: 4 in  Needle localization: anatomical landmarks and ultrasound guidance  Needle insertion depth: 3 cm  Assessment  Injection assessment: incremental injection, frequent aspiration, injected with ease, negative aspiration, negative for heart rate change, no paresthesia on injection, no symptoms of intraneural/intravenous injection and needle tip visualized at all times  Paresthesia pain: none  Post-procedure:  site cleaned  patient tolerated the procedure well with no immediate complications

## 2024-11-27 NOTE — DISCHARGE SUMMARY
Discharge Summary Outpatient Procedure Podiatry -   Richard Markham 19 y.o. female MRN: [id number]  Unit/Bed#: OR POOL Encounter: [id number]    Admission Date: 11/27/2024     Admitting Diagnosis: Sprain of calcaneofibular ligament of unspecified ankle, initial encounter [S93.419A]  Sprain of calcaneofibular ligament of right ankle, initial encounter [S93.411A]  Other specific joint derangements of right ankle, not elsewhere classified [M24.871]  Peroneal tendinitis, right leg [M76.71]  Strain of muscle(s) and tendon(s) of peroneal muscle group at lower leg level, right leg, initial encounter [S86.311A]    Discharge Diagnosis: same    Procedures Performed: ANKLE LIGAMENT REPAIR ATFL, CFT; REPAIR PERONEAL TENDON, TENDON GRAFT:     Complications: none    Condition at Discharge: stable    Discharge instructions/Information to patient and family:   See after visit summary for information provided to patient and family.      Provisions for Follow-Up Care/Important appointments:  See after visit summary for information related to follow-up care and any pertinent home health orders.      Discharge Medications:  See after visit summary for reconciled discharge medications provided to patient and family.

## 2024-11-27 NOTE — OP NOTE
OPERATIVE REPORT - Podiatry  PATIENT NAME: Richard Markham    :  2005  MRN: [id number]  Pt Location:  OR ROOM 10    SURGERY DATE: 2024    Surgeons and Role:     * Joshua Bledsoe, DPM - Primary     * Yo English, TORRESM - Assisting     * Jovany Brunson, TORRESM - Assisting    Pre-op Diagnosis:  Sprain of calcaneofibular ligament of unspecified ankle, initial encounter [S93.419A]  Sprain of calcaneofibular ligament of right ankle, initial encounter [S93.411A]  Other specific joint derangements of right ankle, not elsewhere classified [M24.871]  Peroneal tendinitis, right leg [M76.71]  Strain of muscle(s) and tendon(s) of peroneal muscle group at lower leg level, right leg, initial encounter [S86.311A]    Post-Op Diagnosis Codes:     * Sprain of calcaneofibular ligament of unspecified ankle, initial encounter [S93.419A]     * Sprain of calcaneofibular ligament of right ankle, initial encounter [S93.411A]     * Other specific joint derangements of right ankle, not elsewhere classified [M24.871]     * Peroneal tendinitis, right leg [M76.71]     * Strain of muscle(s) and tendon(s) of peroneal muscle group at lower leg level, right leg, initial encounter [S86.311A]    Procedure(s) (LRB):  ANKLE LIGAMENT REPAIR ATFL, CFT; REPAIR PERONEAL TENDON, TENDON GRAFT (Right)    Specimen(s):  * No specimens in log *    Estimated Blood Loss:   Minimal    Drains:  * No LDAs found *    Anesthesia Type:   General w/ Popliteal Block     Hemostasis:  Pneumatic thigh tourniquet  Electrocautery     Materials:  Implant Name Type Inv. Item Serial No.  Lot No. LRB No. Used Action   ANCHOR SONIC KIT 2.5 X 10MM FORCE FIBER 0 C-2 - [id number]  ANCHOR SONIC KIT 2.5 X 10MM FORCE FIBER 0 C-2  [employer] [id number] Right 1 Implanted   ANCHOR SONIC KIT 2.5 X 10MM FORCE FIBER 0 C-2 - [id number]  ANCHOR SONIC KIT 2.5 X 10MM FORCE FIBER 0 C-2  [employer] [id number] Right 1 Implanted   FLEX[employer] 0.5CM X 16CM    [id number]   Right 1 Implanted         Operative Findings:  Peroneus brevis noted to be flattened from the level of the distal fibula to just proximal to its insertion  Peroneus longus noted to be healthy with no tear  ATFL and CFL repair with negative talar tilt and negative anterior drawer test    Complications:   None    Procedure and Technique:     Under mild sedation, the patient was brought into the operating room and placed on the operating room table in the supine position. IV sedation was achieved by anesthesia team and a universal timeout was performed where all parties are in agreement of correct patient, correct procedure and correct site. A pneumatic tourniquet was then placed over the patient's right lower extremity with ample padding. The foot was then prepped and draped in the usual aseptic manner. An esmarch bandage was used to exsangunate the foot and the pneumatic tourniquet was then inflated to 300mmHg.    Attention directed to right ankle.  Longitudinal curvilinear incision made along course of peroneal tendons from distal fibula to the 5th metatarsal base. Sharp and blunt dissection through subcutaneous tissue. Sharp dissection through tendon sheath. Peroneal tendons were examined. Peroneus longus noted to be healthy with no tear. Peroneus brevis noted to be flattened from the level of the distal fibula to just proximal to its insertion. Flexband was used to augment this tendon by tubularizing the tendon around it.     Attention directed to lateral malleolus where periosteum was sharply reflected. Rongeur and rasp used to expose cancellous bone (prep bone for sonic anchors). Two sonic anchors were implanted here. Corresponding sutures were used to repair ATFL and CFL. After repair, negative talar tilt and negative anterior drawer tests noted. Flushed with nss. Tendon sheath and deep tissue repaired with vicryl. Subcutaneous closure with vicryl. Skin closure with nylon. Dressed with adaptic,  "4x4 gauze, kerlix. Posterior splint applied with plenty of cast padding and ankle at 90 degrees.     The tourniquet was deflated and normal hyperemic response was noted to all digits. The patient tolerated the procedure and anesthesia well without immediate complications and transferred to PACU with vital signs stable.     Dr. Bledsoe was present during the entire procedure and participated in all key aspects.    SIGNATURE: Jovany Brunson DPM  DATE: November 27, 2024  TIME: 1:41 PM      Portions of the record may have been created with voice recognition software. Occasional wrong word or \"sound a like\" substitutions may have occurred due to the inherent limitations of voice recognition software. Read the chart carefully and recognize, using context, where substitutions have occurred.    "

## 2024-11-27 NOTE — PROGRESS NOTES
Right Popliteal Block Note:    Time out preformed. Laterality verified. Patient sedated on standard ASA monitors. Patient prepped in sterile fashion. Needle advanced under ultrasound guidance. 30 mL of a mixture of 20 mL Exparel and 10 mL of 0.5% Bupivacaine injected after negative aspiration in 5 cc increments for right popliteal block. Image stored. No complications apparent. VSS.

## 2024-11-27 NOTE — NURSING NOTE
Pain decreased after Percocet was given. Ambulated to the bathroom with use of crutches and supervision of staff member. Voided. Did very well with crutches. IV removed. Mom assisting her with getting ready to go home.

## 2024-11-27 NOTE — ANESTHESIA PREPROCEDURE EVALUATION
Procedure:  ANKLE LIGAMENT REPAIR ATFL, CFT; REPAIR PERONEAL TENDON, TENDON GRAFT ASNEEDED (Right: Ankle)    Relevant Problems   ANESTHESIA   (+) Motion sickness      CARDIO   (+) Heart murmur      GI/HEPATIC   (+) Gastroesophageal reflux disease without esophagitis      HEMATOLOGY   (+) Absolute anemia      NEURO/PSYCH   (+) Chronic right shoulder pain   (+) REUBEN (generalized anxiety disorder)   (+) Major depressive disorder, single episode, moderate degree (HCC)      PULMONARY   (+) Asthma      Behavioral Health   (+) Attention deficit hyperactivity disorder, inattentive type      Rheumatology   (+) TMJ dysfunction      Other   (+) Class 1 obesity due to excess calories without serious comorbidity with body mass index (BMI) of 31.0 to 31.9 in adult       Latest Reference Range & Units 11/27/24 09:54   PREGNANCY TEST URINE Negative  Negative     Physical Exam    Airway    Mallampati score: II  TM Distance: >3 FB  Neck ROM: full     Dental   No notable dental hx     Cardiovascular      Pulmonary      Other Findings  post-pubertal.      Anesthesia Plan  ASA Score- 2     Anesthesia Type- general with ASA Monitors.         Additional Monitors:     Airway Plan: ETT.           Plan Factors-Exercise tolerance (METS): >4 METS.    Chart reviewed.   Existing labs reviewed. Patient summary reviewed.    Patient is a current smoker.  Patient did not smoke on day of surgery.            Induction- intravenous.    Postoperative Plan- Plan for postoperative opioid use. Planned trial extubation    Perioperative Resuscitation Plan - Level 1 - Full Code.       Informed Consent- Anesthetic plan and risks discussed with patient.  I personally reviewed this patient with the CRNA. Discussed and agreed on the Anesthesia Plan with the CRNA..

## 2024-12-05 ENCOUNTER — HOSPITAL ENCOUNTER (EMERGENCY)
Facility: HOSPITAL | Age: 19
Discharge: HOME | End: 2024-12-05
Attending: EMERGENCY MEDICINE
Payer: COMMERCIAL

## 2024-12-05 VITALS
RESPIRATION RATE: 18 BRPM | SYSTOLIC BLOOD PRESSURE: 133 MMHG | OXYGEN SATURATION: 99 % | HEART RATE: 110 BPM | TEMPERATURE: 99.1 F | DIASTOLIC BLOOD PRESSURE: 85 MMHG

## 2024-12-05 DIAGNOSIS — G89.18 POST-OP PAIN: Primary | ICD-10-CM

## 2024-12-05 PROCEDURE — 99283 EMERGENCY DEPT VISIT LOW MDM: CPT

## 2024-12-05 PROCEDURE — 99283 EMERGENCY DEPT VISIT LOW MDM: CPT | Performed by: EMERGENCY MEDICINE

## 2024-12-06 NOTE — ED PROVIDER NOTES
"Time reflects when diagnosis was documented in both MDM as applicable and the Disposition within this note       Time User Action Codes Description Comment    12/5/2024 10:30 PM Catarino Avelar Add [G89.18] Post-op pain     12/5/2024 10:30 PM Catarino Avelar Modify [G89.18] Post-op pain discomfort with the splint          ED Disposition       ED Disposition   Discharge    Condition   Stable    Date/Time   Thu Dec 5, 2024 10:30 PM    Comment   Richard Markham discharge to home/self care.                   Assessment & Plan       Medical Decision Making  Richard came due to discomfort from her recently replaced splint at her podiatrist office.  Splint was removed and padded and replaced.  Patient endorsed complete resolution of discomfort.  Did not need pain medications at this time.  Ankle appeared well and was neurovascularly intact.  No suspicion for septic joint.  Patient instructed to follow as instructed by podiatry.  Patient understands and agrees with the plan.  Strict return precautions given if symptoms are worsening or not resolving.  Patient discharged in stable condition.      Portions of the record have been created with voice recognition software.  Occasional wrong word or \"sound a like\" substitution may have occurred due to the inherent limitations of voice recognition software.  Read the chart carefully and recognize, using context, where substitutions have occurred.                Medications - No data to display    ED Risk Strat Scores                                               History of Present Illness       Chief Complaint   Patient presents with    Post-op Problem     Pt had right foot surgery last Wednesday. Pt seen by by pediatrist today and he took the splint off and re-splinted. Pt reports having significant pain in her right ankle, thinks the splint is resting directly on her ankle causing the pain.        Past Medical History:   Diagnosis Date    Acne     ADHD     Anemia     Anxiety "     Asthma     Concussion     X 2 IN 2017    Depression     Fracture of lumbar spine (HCC)     GERD (gastroesophageal reflux disease)     Hydronephrosis     Pertussis     Stress fracture     Vesicoureteral reflux       Past Surgical History:   Procedure Laterality Date    ANKLE LIGAMENT REPAIR (BROSTRUM) Right 11/27/2024    Procedure: ANKLE LIGAMENT REPAIR ATFL, CFT; REPAIR PERONEAL TENDON, TENDON GRAFT;  Surgeon: Joshua Bledsoe DPM;  Location:  MAIN OR;  Service: Podiatry    CYST REMOVAL      Left forearm    FOOT SURGERY Right     WISDOM TOOTH EXTRACTION        Family History   Problem Relation Age of Onset    Hypertension Mother     Heart murmur Mother     Depression Mother     Anxiety disorder Mother     Cancer Mother     Celiac disease Father     ADD / ADHD Father     Cancer Paternal Grandmother         adrenal gland    Celiac disease Paternal Grandfather     Anxiety disorder Sister     Depression Sister     ADD / ADHD Sister       Social History     Tobacco Use    Smoking status: Never    Smokeless tobacco: Never   Vaping Use    Vaping status: Former   Substance Use Topics    Alcohol use: Yes     Comment: rarely    Drug use: Never      E-Cigarette/Vaping    E-Cigarette Use Former User       E-Cigarette/Vaping Substances    Nicotine No     THC No     CBD No     Flavoring No     Other No     Unknown No       I have reviewed and agree with the history as documented.     19-year-old female with history of recent right foot surgery presents with discomfort due to splint.  She had at resplinted today at the podiatrist office and states that it is rubbing against her heel.  She denies any numbness tingling, loss of sensation, decreased movement, or increased pain.  She has no other complaints at this time including fever/chills.        Review of Systems   Constitutional:  Negative for chills and fever.   HENT:  Negative for congestion, rhinorrhea and sneezing.    Eyes:  Negative for pain and visual disturbance.    Respiratory:  Negative for cough and shortness of breath.    Cardiovascular:  Negative for chest pain and palpitations.   Gastrointestinal:  Negative for abdominal pain, constipation, diarrhea, nausea and vomiting.   Genitourinary:  Negative for dysuria and hematuria.   Musculoskeletal:  Positive for arthralgias (Right ankle, heel due to splint). Negative for back pain.   Skin:  Negative for color change and rash.   Neurological:  Negative for syncope, weakness, numbness and headaches.   All other systems reviewed and are negative.          Objective       ED Triage Vitals [12/05/24 2205]   Temperature Pulse Blood Pressure Respirations SpO2 Patient Position - Orthostatic VS   99.1 °F (37.3 °C) (!) 110 133/85 18 99 % Sitting      Temp Source Heart Rate Source BP Location FiO2 (%) Pain Score    Oral Monitor Right arm -- --      Vitals      Date and Time Temp Pulse SpO2 Resp BP Pain Score FACES Pain Rating User   12/05/24 2205 99.1 °F (37.3 °C) 110 99 % 18 133/85 -- -- EG            Physical Exam  Vitals and nursing note reviewed.   Constitutional:       General: She is not in acute distress.     Appearance: She is not ill-appearing.   HENT:      Head: Normocephalic and atraumatic.      Right Ear: External ear normal.      Left Ear: External ear normal.      Nose: Nose normal. No congestion or rhinorrhea.      Mouth/Throat:      Mouth: Mucous membranes are moist.      Pharynx: Oropharynx is clear.   Eyes:      General: No scleral icterus.     Extraocular Movements: Extraocular movements intact.   Cardiovascular:      Rate and Rhythm: Normal rate and regular rhythm.      Pulses: Normal pulses.      Heart sounds: Normal heart sounds.   Pulmonary:      Effort: Pulmonary effort is normal.      Breath sounds: Normal breath sounds.   Abdominal:      Palpations: Abdomen is soft.      Tenderness: There is no abdominal tenderness.   Musculoskeletal:         General: Signs of injury (Surgical padding in place, splint removed  and had small amount of padding at the ankle and heel area.) present. No tenderness. Normal range of motion.      Cervical back: Normal range of motion.   Skin:     General: Skin is warm and dry.   Neurological:      General: No focal deficit present.      Mental Status: She is alert and oriented to person, place, and time.   Psychiatric:         Mood and Affect: Mood normal.         Behavior: Behavior normal.         Results Reviewed       None            No orders to display       Procedures    ED Medication and Procedure Management   Prior to Admission Medications   Prescriptions Last Dose Informant Patient Reported? Taking?   Glycopyrronium Tosylate 2.4 % PADS   Yes No   Sig: Apply topically   Semaglutide-Weight Management (WEGOVY) 0.25 MG/0.5ML   No No   Sig: Inject 0.5 mL (0.25 mg total) under the skin once a week for 28 days   Patient not taking: Reported on 11/19/2024   albuterol (PROVENTIL HFA,VENTOLIN HFA) 90 mcg/act inhaler   No No   Sig: TAKE 2 PUFFS BY MOUTH EVERY 6 HOURS AS NEEDED FOR WHEEZE   benzonatate (TESSALON) 200 MG capsule   No No   Sig: Take 1 capsule (200 mg total) by mouth 3 (three) times a day as needed for cough   cyclobenzaprine (FLEXERIL) 5 mg tablet   No No   Sig: Take 1 tablet (5 mg total) by mouth daily at bedtime   fluticasone (FLONASE) 50 mcg/act nasal spray   No No   Sig: SPRAY 1 SPRAY INTO EACH NOSTRIL EVERY DAY   loratadine (CLARITIN) 10 mg tablet   No No   Sig: TAKE 1 TABLET BY MOUTH EVERY DAY   medroxyPROGESTERone acetate (DEPO-PROVERA SYRINGE) 150 mg/mL injection   No No   Sig: Inject 1 mL (150 mg total) into a muscle every 3 (three) months   naproxen (NAPROSYN) 375 mg tablet   No No   Sig: Take 1 tablet (375 mg total) by mouth 2 (two) times a day with meals   nystatin (MYCOSTATIN) cream   No No   Sig: Apply 2 g (1 Application total) topically 2 (two) times a day To affected area   ondansetron (ZOFRAN-ODT) 4 mg disintegrating tablet   No No   Sig: Take 1 tablet (4 mg total) by  mouth every 6 (six) hours as needed for nausea or vomiting   pantoprazole (PROTONIX) 40 mg tablet   No No   Sig: TAKE 1 TABLET BY MOUTH DAILY BEFORE BREAKFAST   tretinoin (RETIN-A) 0.025 % cream   Yes No      Facility-Administered Medications: None     Patient's Medications   Discharge Prescriptions    No medications on file     No discharge procedures on file.  ED SEPSIS DOCUMENTATION   Time reflects when diagnosis was documented in both MDM as applicable and the Disposition within this note       Time User Action Codes Description Comment    12/5/2024 10:30 PM Catarino Avelar Add [G89.18] Post-op pain     12/5/2024 10:30 PM Catarino Avelar Modify [G89.18] Post-op pain discomfort with the splint                 Catarino Avelar DO  12/05/24 1209

## 2024-12-06 NOTE — ED ATTENDING ATTESTATION
12/5/2024  I, Enrico Nieto MD, saw and evaluated the patient. I have discussed the patient with the resident/non-physician practitioner and agree with the resident's/non-physician practitioner's findings, Plan of Care, and MDM as documented in the resident's/non-physician practitioner's note, except where noted. All available labs and Radiology studies were reviewed.  I was present for key portions of any procedure(s) performed by the resident/non-physician practitioner and I was immediately available to provide assistance.       At this point I agree with the current assessment done in the Emergency Department.  I have conducted an independent evaluation of this patient a history and physical is as follows:    19-year-old female presented for evaluation of pain after having a splint placed at her podiatrist office today.  She had surgery a week ago and her initial postoperative splint was fine.  After it was replaced in the office today she has had pain in the ankle.  On examination of the splint there was no Webril padding underneath fiberglass, it was actually over top of the fiberglass.  Patient was resplinted here appropriately with improvement in symptoms and will be discharged home.    ED Course         Critical Care Time  Procedures

## 2024-12-12 ENCOUNTER — PATIENT MESSAGE (OUTPATIENT)
Dept: FAMILY MEDICINE CLINIC | Facility: CLINIC | Age: 19
End: 2024-12-12

## 2024-12-13 NOTE — PATIENT COMMUNICATION
Please see message from patient regarding her her low iron levels when she was seen in ER on 12/7/24 and if f/u labs is needed

## 2024-12-18 NOTE — PATIENT COMMUNICATION
Patient called about the message she sent requesting lab work to be sent to [facility] labs.

## 2024-12-19 DIAGNOSIS — D50.8 OTHER IRON DEFICIENCY ANEMIA: Primary | ICD-10-CM

## 2024-12-30 ENCOUNTER — APPOINTMENT (OUTPATIENT)
Dept: LAB | Age: 19
End: 2024-12-30
Payer: COMMERCIAL

## 2024-12-30 DIAGNOSIS — D50.8 OTHER IRON DEFICIENCY ANEMIA: ICD-10-CM

## 2024-12-30 LAB
BASOPHILS # BLD AUTO: 0.04 THOUSANDS/ΜL (ref 0–0.1)
BASOPHILS NFR BLD AUTO: 1 % (ref 0–1)
EOSINOPHIL # BLD AUTO: 0.23 THOUSAND/ΜL (ref 0–0.61)
EOSINOPHIL NFR BLD AUTO: 4 % (ref 0–6)
ERYTHROCYTE [DISTWIDTH] IN BLOOD BY AUTOMATED COUNT: 12.9 % (ref 11.6–15.1)
FERRITIN SERPL-MCNC: 40 NG/ML (ref 11–307)
HCT VFR BLD AUTO: 39.4 % (ref 34.8–46.1)
HGB BLD-MCNC: 12.9 G/DL (ref 11.5–15.4)
IMM GRANULOCYTES # BLD AUTO: 0.07 THOUSAND/UL (ref 0–0.2)
IMM GRANULOCYTES NFR BLD AUTO: 1 % (ref 0–2)
IRON SATN MFR SERPL: 24 % (ref 15–50)
IRON SERPL-MCNC: 90 UG/DL (ref 50–212)
LYMPHOCYTES # BLD AUTO: 1.33 THOUSANDS/ΜL (ref 0.6–4.47)
LYMPHOCYTES NFR BLD AUTO: 24 % (ref 14–44)
MCH RBC QN AUTO: 28.5 PG (ref 26.8–34.3)
MCHC RBC AUTO-ENTMCNC: 32.7 G/DL (ref 31.4–37.4)
MCV RBC AUTO: 87 FL (ref 82–98)
MONOCYTES # BLD AUTO: 0.36 THOUSAND/ΜL (ref 0.17–1.22)
MONOCYTES NFR BLD AUTO: 7 % (ref 4–12)
NEUTROPHILS # BLD AUTO: 3.41 THOUSANDS/ΜL (ref 1.85–7.62)
NEUTS SEG NFR BLD AUTO: 63 % (ref 43–75)
NRBC BLD AUTO-RTO: 0 /100 WBCS
PLATELET # BLD AUTO: 306 THOUSANDS/UL (ref 149–390)
PMV BLD AUTO: 10.2 FL (ref 8.9–12.7)
RBC # BLD AUTO: 4.52 MILLION/UL (ref 3.81–5.12)
TIBC SERPL-MCNC: 373.8 UG/DL (ref 250–450)
TRANSFERRIN SERPL-MCNC: 267 MG/DL (ref 203–362)
UIBC SERPL-MCNC: 284 UG/DL (ref 155–355)
WBC # BLD AUTO: 5.44 THOUSAND/UL (ref 4.31–10.16)

## 2024-12-30 PROCEDURE — 83540 ASSAY OF IRON: CPT

## 2024-12-30 PROCEDURE — 82728 ASSAY OF FERRITIN: CPT

## 2024-12-30 PROCEDURE — 85025 COMPLETE CBC W/AUTO DIFF WBC: CPT

## 2024-12-30 PROCEDURE — 83550 IRON BINDING TEST: CPT

## 2024-12-30 PROCEDURE — 36415 COLL VENOUS BLD VENIPUNCTURE: CPT

## 2024-12-31 ENCOUNTER — OFFICE VISIT (OUTPATIENT)
Dept: FAMILY MEDICINE CLINIC | Facility: CLINIC | Age: 19
End: 2024-12-31
Payer: COMMERCIAL

## 2024-12-31 VITALS
HEART RATE: 118 BPM | DIASTOLIC BLOOD PRESSURE: 88 MMHG | HEIGHT: 60 IN | OXYGEN SATURATION: 99 % | SYSTOLIC BLOOD PRESSURE: 132 MMHG | WEIGHT: 157 LBS | RESPIRATION RATE: 16 BRPM | BODY MASS INDEX: 30.82 KG/M2 | TEMPERATURE: 98.5 F

## 2024-12-31 DIAGNOSIS — D50.8 IRON DEFICIENCY ANEMIA SECONDARY TO INADEQUATE DIETARY IRON INTAKE: ICD-10-CM

## 2024-12-31 DIAGNOSIS — E66.09 CLASS 1 OBESITY DUE TO EXCESS CALORIES WITHOUT SERIOUS COMORBIDITY WITH BODY MASS INDEX (BMI) OF 30.0 TO 30.9 IN ADULT: Primary | ICD-10-CM

## 2024-12-31 DIAGNOSIS — E66.811 CLASS 1 OBESITY DUE TO EXCESS CALORIES WITHOUT SERIOUS COMORBIDITY WITH BODY MASS INDEX (BMI) OF 30.0 TO 30.9 IN ADULT: Primary | ICD-10-CM

## 2024-12-31 DIAGNOSIS — E55.9 VITAMIN D DEFICIENCY: ICD-10-CM

## 2024-12-31 PROCEDURE — 99213 OFFICE O/P EST LOW 20 MIN: CPT | Performed by: FAMILY MEDICINE

## 2024-12-31 RX ORDER — IBUPROFEN 600 MG/1
1 TABLET, FILM COATED ORAL 4 TIMES DAILY
COMMUNITY
Start: 2024-11-25

## 2024-12-31 NOTE — ASSESSMENT & PLAN NOTE
-  Her vitamin D levels have been low in the past. Last checked 8/2023.  - Advised she can take 2000 units of vitamin D daily and can see if this helps with fatigue.

## 2024-12-31 NOTE — PROGRESS NOTES
Name: Richard Markham      : 2005      MRN: [id number]  Encounter Provider: Tomy Montoya MD  Encounter Date: 2024   Encounter department: [facility]  :  Assessment & Plan  Class 1 obesity due to excess calories without serious comorbidity with body mass index (BMI) of 30.0 to 30.9 in adult  - Start Wegovy 0.25mg weekly. Advised potential side effects.  - Discussed healthy diet and regular exercise.  - Advised to follow up in 1 month after receiving medication.     Orders:  •  Semaglutide-Weight Management (WEGOVY) 0.25 MG/0.5ML; Inject 0.5 mL (0.25 mg total) under the skin once a week for 28 days    Vitamin D deficiency  -  Her vitamin D levels have been low in the past. Last checked 2023.  - Advised she can take 2000 units of vitamin D daily and can see if this helps with fatigue.        Iron deficiency anemia secondary to inadequate dietary iron intake  - Anemia improved on repeat CBC with normal iron panel.  - Will continue to monitor.               History of Present Illness     Patient presenting to the office for follow up of multiple medical problems. She recently had ankle surgery and has been recovering well. She was in the ER  with some chest pain and was found to be anemic likely due to post surgery. Her hemoglobin was 8.9 and it was rechecked and is now 12.9 with a normal iron panel. She is still feeling fatigued after surgery. She is wondering if its related to vitamin D as she has been low in the past but does not take any supplements. She also has not started Wegovy yet due to some insurance issues but states she should be able to get it now.       Review of Systems   Constitutional:  Negative for chills, fatigue and fever.   HENT:  Negative for congestion, ear pain and sore throat.    Eyes:  Negative for pain.   Respiratory:  Negative for cough and shortness of breath.    Cardiovascular:  Negative for chest pain and palpitations.   Gastrointestinal:   Negative for abdominal pain, nausea and vomiting.   Genitourinary:  Negative for dysuria.   Musculoskeletal:  Negative for arthralgias and back pain.   Skin:  Negative for rash.   Neurological:  Negative for dizziness and headaches.       Objective   /88 (BP Location: Left arm, Patient Position: Sitting, Cuff Size: Standard)   Pulse (!) 118   Temp 98.5 °F (36.9 °C) (Tympanic)   Resp 16   Ht 5' (1.524 m)   Wt 71.2 kg (157 lb)   SpO2 99%   BMI 30.66 kg/m²      Physical Exam  Vitals and nursing note reviewed.   Constitutional:       General: She is not in acute distress.     Appearance: She is well-developed.   HENT:      Head: Normocephalic and atraumatic.   Eyes:      Conjunctiva/sclera: Conjunctivae normal.   Cardiovascular:      Rate and Rhythm: Regular rhythm. Tachycardia present.      Heart sounds: No murmur heard.  Pulmonary:      Effort: Pulmonary effort is normal. No respiratory distress.      Breath sounds: Normal breath sounds.   Abdominal:      Palpations: Abdomen is soft.      Tenderness: There is no abdominal tenderness.   Musculoskeletal:         General: No swelling.      Cervical back: Neck supple.   Skin:     General: Skin is warm and dry.      Capillary Refill: Capillary refill takes less than 2 seconds.   Neurological:      Mental Status: She is alert.   Psychiatric:         Mood and Affect: Mood normal.

## 2024-12-31 NOTE — ASSESSMENT & PLAN NOTE
- Start Wegovy 0.25mg weekly. Advised potential side effects.  - Discussed healthy diet and regular exercise.  - Advised to follow up in 1 month after receiving medication.     Orders:  •  Semaglutide-Weight Management (WEGOVY) 0.25 MG/0.5ML; Inject 0.5 mL (0.25 mg total) under the skin once a week for 28 days

## 2025-01-10 ENCOUNTER — CLINICAL SUPPORT (OUTPATIENT)
Dept: OBGYN CLINIC | Facility: MEDICAL CENTER | Age: 20
End: 2025-01-10
Payer: COMMERCIAL

## 2025-01-10 DIAGNOSIS — Z30.42 ENCOUNTER FOR SURVEILLANCE OF INJECTABLE CONTRACEPTIVE: Primary | ICD-10-CM

## 2025-01-10 PROCEDURE — 96372 THER/PROPH/DIAG INJ SC/IM: CPT

## 2025-01-10 RX ADMIN — MEDROXYPROGESTERONE ACETATE 150 MG: 150 INJECTION, SUSPENSION INTRAMUSCULAR at 11:30

## 2025-01-10 NOTE — PROGRESS NOTES
Patient presents for Depo-Provera injection.  Patient supplied medication.  Patient tolerated IM injection LUOQ.  Denied questions or concerns at conclusion of conversation.  Aware repeat injection is due in 3 months.  Encouraged to schedule prior to leaving office at today's visit.    Lot # [id number] NDC 11527-968-86 Expiration date 02/28/2029

## 2025-01-14 RX ORDER — MEDROXYPROGESTERONE ACETATE 150 MG/ML
150 INJECTION, SUSPENSION INTRAMUSCULAR ONCE
Status: SHIPPED | OUTPATIENT
Start: 2025-01-14

## 2025-01-27 ENCOUNTER — TELEPHONE (OUTPATIENT)
Age: 20
End: 2025-01-27

## 2025-01-27 NOTE — TELEPHONE ENCOUNTER
Patient has been added to the Talk [facility] wait list without a referral. Richard was a patient at the [place] office with [facility] and Magda Thompson. She stopped her Therapy but now needs services again.  Insurance: [employer] P[employer]  Insurance Type:    Commercial [x]   Medicaid []   [employer] (if applicable)   Medicare []  Location Preference: [place]  Provider Preference: Female Provider  Virtual: Yes [] No [x]  Were outside resources sent: Yes [] No [x]

## 2025-02-06 ENCOUNTER — TELEPHONE (OUTPATIENT)
Age: 20
End: 2025-02-06

## 2025-02-06 NOTE — TELEPHONE ENCOUNTER
Paint state she takes WEGOVY, patient consumed first dose this week, a minor headache followed. Patient state she took another dose on Tuesday, and has a major headache. Questions if headache is side effect of the medication, and questions if OTC pain reliever should be taken. Please advise Patient at [phone], if any further questions.

## 2025-02-21 ENCOUNTER — OFFICE VISIT (OUTPATIENT)
Dept: FAMILY MEDICINE CLINIC | Facility: CLINIC | Age: 20
End: 2025-02-21
Payer: COMMERCIAL

## 2025-02-21 VITALS
WEIGHT: 154 LBS | RESPIRATION RATE: 16 BRPM | TEMPERATURE: 98.8 F | HEART RATE: 130 BPM | SYSTOLIC BLOOD PRESSURE: 124 MMHG | BODY MASS INDEX: 29.07 KG/M2 | HEIGHT: 61 IN | DIASTOLIC BLOOD PRESSURE: 88 MMHG | OXYGEN SATURATION: 99 %

## 2025-02-21 DIAGNOSIS — R11.0 NAUSEA: ICD-10-CM

## 2025-02-21 DIAGNOSIS — J45.909 ASTHMA, UNSPECIFIED ASTHMA SEVERITY, UNSPECIFIED WHETHER COMPLICATED, UNSPECIFIED WHETHER PERSISTENT: Primary | ICD-10-CM

## 2025-02-21 DIAGNOSIS — E66.3 OVERWEIGHT (BMI 25.0-29.9): ICD-10-CM

## 2025-02-21 PROCEDURE — 99213 OFFICE O/P EST LOW 20 MIN: CPT | Performed by: FAMILY MEDICINE

## 2025-02-21 RX ORDER — ALUMINUM CHLORIDE 20 %
SOLUTION, NON-ORAL TOPICAL
COMMUNITY
Start: 2025-02-16

## 2025-02-21 RX ORDER — LEVALBUTEROL TARTRATE 45 UG/1
1 AEROSOL, METERED ORAL
COMMUNITY

## 2025-02-21 RX ORDER — SEMAGLUTIDE 0.25 MG/.5ML
0.25 INJECTION, SOLUTION SUBCUTANEOUS
COMMUNITY
Start: 2025-01-17 | End: 2025-02-21

## 2025-02-21 RX ORDER — ONDANSETRON 4 MG/1
4 TABLET, ORALLY DISINTEGRATING ORAL EVERY 6 HOURS PRN
Qty: 20 TABLET | Refills: 0 | Status: SHIPPED | OUTPATIENT
Start: 2025-02-21

## 2025-02-21 NOTE — PROGRESS NOTES
Name: Richard Markham      : 2005      MRN: [id number]  Encounter Provider: Tomy Montoya MD  Encounter Date: 2025   [facility]    Assessment & Plan  Asthma, unspecified asthma severity, unspecified whether complicated, unspecified whether persistent  Stable.  Continue same.  We will continue to monitor.         Overweight (BMI 25.0-29.9)  Improving on Wegovy.  I am going to increase to 0.5 mg weekly.  Discussed with possible side effects.  Come back in 1 month for follow-up.  Orders:  •  Semaglutide-Weight Management (WEGOVY) 0.5 MG/0.5ML; Inject 0.5 mL (0.5 mg total) under the skin once a week for 28 days    Nausea    Orders:  •  ondansetron (ZOFRAN-ODT) 4 mg disintegrating tablet; Take 1 tablet (4 mg total) by mouth every 6 (six) hours as needed for nausea or vomiting    BMI 29.0-29.9,adult  Improving on Wegovy.  I am going to increase to 0.5 mg weekly.  Discussed with possible side effects.  Come back in 1 month for follow-up.            History of Present Illness     She is here today for follow-up for weight management.  She was started last visit on Wegovy 0.25 mg weekly.  She has been doing well on the medication.  She lost 3 pounds in the last visit.  She complained of nauseous feeling and headache after she daily medication.  She denies any other complaint.      Review of Systems   Constitutional:  Negative for chills and fever.   HENT:  Negative for trouble swallowing.    Eyes:  Negative for visual disturbance.   Respiratory:  Negative for cough and shortness of breath.    Cardiovascular:  Negative for chest pain, palpitations and leg swelling.   Gastrointestinal:  Positive for nausea. Negative for abdominal pain, constipation and diarrhea.   Endocrine: Negative for cold intolerance and heat intolerance.   Genitourinary:  Negative for difficulty urinating and dysuria.   Musculoskeletal:  Negative for gait problem.   Skin:  Negative for rash.    Neurological:  Positive for headaches. Negative for dizziness, tremors and seizures.   Hematological:  Negative for adenopathy.   Psychiatric/Behavioral:  Negative for behavioral problems.      Past Medical History:   Diagnosis Date   • Acne    • ADHD    • Anemia    • Anxiety    • Asthma    • Concussion     X 2 IN 2017   • Depression    • Fracture of lumbar spine (HCC)    • GERD (gastroesophageal reflux disease)    • Hydronephrosis    • Pertussis    • Stress fracture    • Vesicoureteral reflux      Past Surgical History:   Procedure Laterality Date   • ANKLE LIGAMENT REPAIR (BROSTRUM) Right 11/27/2024    Procedure: ANKLE LIGAMENT REPAIR ATFL, CFT; REPAIR PERONEAL TENDON, TENDON GRAFT;  Surgeon: Joshua Bledsoe DPM;  Location:  MAIN OR;  Service: Podiatry   • CYST REMOVAL      Left forearm   • FOOT SURGERY Right    • WISDOM TOOTH EXTRACTION       Family History   Problem Relation Age of Onset   • Hypertension Mother    • Heart murmur Mother    • Depression Mother    • Anxiety disorder Mother    • Cancer Mother    • Celiac disease Father    • ADD / ADHD Father    • Cancer Paternal Grandmother         adrenal gland   • Celiac disease Paternal Grandfather    • Anxiety disorder Sister    • Depression Sister    • ADD / ADHD Sister      Social History     Tobacco Use   • Smoking status: Never   • Smokeless tobacco: Never   Vaping Use   • Vaping status: Former   Substance and Sexual Activity   • Alcohol use: Yes     Comment: rarely   • Drug use: Never   • Sexual activity: Yes     Partners: Male     Birth control/protection: None     Current Outpatient Medications on File Prior to Visit   Medication Sig   • albuterol (PROVENTIL HFA,VENTOLIN HFA) 90 mcg/act inhaler TAKE 2 PUFFS BY MOUTH EVERY 6 HOURS AS NEEDED FOR WHEEZE   • benzonatate (TESSALON) 200 MG capsule Take 1 capsule (200 mg total) by mouth 3 (three) times a day as needed for cough   • cyclobenzaprine (FLEXERIL) 5 mg tablet Take 1 tablet (5 mg total) by mouth  daily at bedtime   • Drysol 20 % external solution Apply topically   • fluticasone (FLONASE) 50 mcg/act nasal spray SPRAY 1 SPRAY INTO EACH NOSTRIL EVERY DAY   • Glycopyrronium Tosylate 2.4 % PADS Apply topically   • ibuprofen (MOTRIN) 600 mg tablet Take 1 tablet by mouth 4 times a day   • levalbuterol (Xopenex HFA) 45 mcg/act inhaler Inhale 1 puff   • loratadine (CLARITIN) 10 mg tablet TAKE 1 TABLET BY MOUTH EVERY DAY   • medroxyPROGESTERone acetate (DEPO-PROVERA SYRINGE) 150 mg/mL injection Inject 1 mL (150 mg total) into a muscle every 3 (three) months   • naproxen (NAPROSYN) 375 mg tablet Take 1 tablet (375 mg total) by mouth 2 (two) times a day with meals   • nystatin (MYCOSTATIN) cream Apply 2 g (1 Application total) topically 2 (two) times a day To affected area   • pantoprazole (PROTONIX) 40 mg tablet TAKE 1 TABLET BY MOUTH DAILY BEFORE BREAKFAST   • tretinoin (RETIN-A) 0.025 % cream    • [DISCONTINUED] ondansetron (ZOFRAN-ODT) 4 mg disintegrating tablet Take 1 tablet (4 mg total) by mouth every 6 (six) hours as needed for nausea or vomiting   • [DISCONTINUED] Semaglutide-Weight Management (Wegovy) 0.25 MG/0.5ML Inject 0.25 mg under the skin     Allergies   Allergen Reactions   • [place] Extract - Food Allergy Shortness Of Breath   • [place] Flavor - Food Allergy Itching     Throat itching     Immunization History   Administered Date(s) Administered   • DTaP 2005, 02/19/2004, 05/19/2004, 03/15/2005, 08/22/2008   • HPV Quadrivalent 08/20/2014, 11/04/2014   • HPV9 06/18/2019   • Hep B, Adolescent or Pediatric 2005, 2005, 05/19/2004   • Hepatitis A 08/30/2012, 08/23/2013   • HiB 2005, 2005, 02/19/2004, 11/22/2004   • Hib (PRP-T) 2005, 2003, 02/19/2004, 11/22/2004   • INFLUENZA 10/12/2004, 11/22/2004, 11/01/2006, 08/22/2008, 10/02/2009, 08/20/2010, 08/22/2011, 08/30/2012, 08/23/2013, 11/04/2014, 10/02/2015, 09/27/2016, 11/05/2017, 10/09/2018, 09/17/2019, 09/29/2020,  "09/24/2021, 09/13/2023   • IPV 2005, 2005, 03/15/2005, 08/22/2008   • Influenza, injectable, quadrivalent, preservative free 0.5 mL 09/17/2019, 09/29/2020, 09/24/2021, 09/13/2023   • MMR 08/20/2004, 08/30/2007   • Meningococcal MCV4, Unspecified 08/20/2014, 03/17/2020   • Meningococcal MCV4P 08/20/2014, 03/17/2020   • Pneumococcal Conjugate PCV 7 2003, 2003, 02/19/2004, 11/22/2004   • Tdap 08/20/2014   • Tuberculin Skin Test-PPD Intradermal 02/25/2022, 09/13/2023   • Varicella 08/20/2004, 08/30/2007     Objective   /88 (BP Location: Left arm, Patient Position: Sitting, Cuff Size: Standard)   Pulse (!) 130   Temp 98.8 °F (37.1 °C) (Tympanic)   Resp 16   Ht 5' 1\" (1.549 m)   Wt 69.9 kg (154 lb)   SpO2 99%   BMI 29.10 kg/m²     Physical Exam  Vitals and nursing note reviewed.   Constitutional:       Appearance: She is well-developed.   HENT:      Head: Normocephalic and atraumatic.   Eyes:      Pupils: Pupils are equal, round, and reactive to light.   Cardiovascular:      Rate and Rhythm: Normal rate and regular rhythm.      Heart sounds: Normal heart sounds.   Pulmonary:      Effort: Pulmonary effort is normal.      Breath sounds: Normal breath sounds.   Abdominal:      General: Bowel sounds are normal.      Palpations: Abdomen is soft.   Musculoskeletal:      Cervical back: Normal range of motion and neck supple.   Lymphadenopathy:      Cervical: No cervical adenopathy.   Skin:     General: Skin is warm.   Neurological:      Mental Status: She is alert and oriented to person, place, and time.         "

## 2025-02-21 NOTE — ASSESSMENT & PLAN NOTE
Improving on Wegovy.  I am going to increase to 0.5 mg weekly.  Discussed with possible side effects.  Come back in 1 month for follow-up.

## 2025-02-21 NOTE — ASSESSMENT & PLAN NOTE
Improving on Wegovy.  I am going to increase to 0.5 mg weekly.  Discussed with possible side effects.  Come back in 1 month for follow-up.  Orders:  •  Semaglutide-Weight Management (WEGOVY) 0.5 MG/0.5ML; Inject 0.5 mL (0.5 mg total) under the skin once a week for 28 days

## 2025-03-19 DIAGNOSIS — E66.3 OVERWEIGHT (BMI 25.0-29.9): ICD-10-CM

## 2025-03-25 DIAGNOSIS — E66.3 OVERWEIGHT (BMI 25.0-29.9): Primary | ICD-10-CM

## 2025-03-25 RX ORDER — SEMAGLUTIDE 0.5 MG/.5ML
INJECTION, SOLUTION SUBCUTANEOUS
OUTPATIENT
Start: 2025-03-25

## 2025-03-25 RX ORDER — SEMAGLUTIDE 1 MG/.5ML
INJECTION, SOLUTION SUBCUTANEOUS
Qty: 2 ML | Refills: 0 | Status: SHIPPED | OUTPATIENT
Start: 2025-03-25 | End: 2025-03-31

## 2025-03-31 ENCOUNTER — OFFICE VISIT (OUTPATIENT)
Dept: FAMILY MEDICINE CLINIC | Facility: CLINIC | Age: 20
End: 2025-03-31
Payer: COMMERCIAL

## 2025-03-31 VITALS
RESPIRATION RATE: 16 BRPM | BODY MASS INDEX: 27.56 KG/M2 | TEMPERATURE: 99.5 F | HEART RATE: 56 BPM | HEIGHT: 61 IN | OXYGEN SATURATION: 96 % | DIASTOLIC BLOOD PRESSURE: 84 MMHG | SYSTOLIC BLOOD PRESSURE: 132 MMHG | WEIGHT: 146 LBS

## 2025-03-31 DIAGNOSIS — E66.3 OVERWEIGHT (BMI 25.0-29.9): ICD-10-CM

## 2025-03-31 DIAGNOSIS — J45.909 ASTHMA, UNSPECIFIED ASTHMA SEVERITY, UNSPECIFIED WHETHER COMPLICATED, UNSPECIFIED WHETHER PERSISTENT: ICD-10-CM

## 2025-03-31 DIAGNOSIS — K21.9 GASTROESOPHAGEAL REFLUX DISEASE WITHOUT ESOPHAGITIS: ICD-10-CM

## 2025-03-31 DIAGNOSIS — D50.8 IRON DEFICIENCY ANEMIA SECONDARY TO INADEQUATE DIETARY IRON INTAKE: Primary | ICD-10-CM

## 2025-03-31 PROCEDURE — 99214 OFFICE O/P EST MOD 30 MIN: CPT | Performed by: FAMILY MEDICINE

## 2025-03-31 NOTE — ASSESSMENT & PLAN NOTE
Improving.  Will going to continue on Wegovy 0.5 mg weekly for 3 months.  It was discussed about low-carb diet and regular exercise.  Orders:  •  Semaglutide-Weight Management (WEGOVY) 0.5 MG/0.5ML; Inject 0.5 mL (0.5 mg total) under the skin once a week  •  Comprehensive metabolic panel; Future  •  TSH, 3rd generation with Free T4 reflex; Future

## 2025-03-31 NOTE — PROGRESS NOTES
Name: Richard Markham      : 2005      MRN: [id number]  Encounter Provider: Tomy Montoya MD  Encounter Date: 3/31/2025   Encounter department: [facility]  :  Assessment & Plan  Iron deficiency anemia secondary to inadequate dietary iron intake  Continue same.  Will continue to monitor CBC and iron panel.  Orders:  •  CBC and differential; Future  •  Iron Panel (Includes Ferritin, Iron Sat%, Iron, and TIBC); Future    Overweight (BMI 25.0-29.9)  Improving.  Will going to continue on Wegovy 0.5 mg weekly for 3 months.  It was discussed about low-carb diet and regular exercise.  Orders:  •  Semaglutide-Weight Management (WEGOVY) 0.5 MG/0.5ML; Inject 0.5 mL (0.5 mg total) under the skin once a week  •  Comprehensive metabolic panel; Future  •  TSH, 3rd generation with Free T4 reflex; Future    Gastroesophageal reflux disease without esophagitis  Stable. Continue pantoprazole daily. Will continue to monitor.        Asthma, unspecified asthma severity, unspecified whether complicated, unspecified whether persistent  Stable.  Continue same.  We will continue to monitor.                History of Present Illness   She is here today for follow-up multiple medical problems.  She has been taking her medication.  Denies a side effect.  She stated since she started taking the Zofran and Tylenol with her Wegovy has been tolerating the medication very well.  She desires to stay on the same dose since it is helping and her side effects are well tolerable.  She denies any other complaint.      Review of Systems   Constitutional:  Negative for chills and fever.   HENT:  Negative for trouble swallowing.    Eyes:  Negative for visual disturbance.   Respiratory:  Negative for cough and shortness of breath.    Cardiovascular:  Negative for chest pain, palpitations and leg swelling.   Gastrointestinal:  Negative for abdominal pain, constipation and diarrhea.   Endocrine: Negative for cold intolerance  "and heat intolerance.   Genitourinary:  Negative for difficulty urinating and dysuria.   Musculoskeletal:  Negative for gait problem.   Skin:  Negative for rash.   Neurological:  Negative for dizziness, tremors, seizures and headaches.   Hematological:  Negative for adenopathy.   Psychiatric/Behavioral:  Negative for behavioral problems.        Objective   /84 (BP Location: Left arm, Patient Position: Sitting, Cuff Size: Adult)   Pulse 56   Temp 99.5 °F (37.5 °C) (Tympanic)   Resp 16   Ht 5' 1\" (1.549 m)   Wt 66.2 kg (146 lb)   SpO2 96%   BMI 27.59 kg/m²      Physical Exam  Vitals and nursing note reviewed.   Constitutional:       Appearance: She is well-developed.   HENT:      Head: Normocephalic and atraumatic.   Eyes:      Pupils: Pupils are equal, round, and reactive to light.   Cardiovascular:      Rate and Rhythm: Normal rate and regular rhythm.      Heart sounds: Normal heart sounds.   Pulmonary:      Effort: Pulmonary effort is normal.      Breath sounds: Normal breath sounds.   Abdominal:      General: Bowel sounds are normal.      Palpations: Abdomen is soft.   Musculoskeletal:      Cervical back: Normal range of motion and neck supple.   Lymphadenopathy:      Cervical: No cervical adenopathy.   Skin:     General: Skin is warm.   Neurological:      Mental Status: She is alert and oriented to person, place, and time.         "

## 2025-03-31 NOTE — ASSESSMENT & PLAN NOTE
Continue same.  Will continue to monitor CBC and iron panel.  Orders:  •  CBC and differential; Future  •  Iron Panel (Includes Ferritin, Iron Sat%, Iron, and TIBC); Future

## 2025-04-09 ENCOUNTER — TELEPHONE (OUTPATIENT)
Dept: OTHER | Facility: OTHER | Age: 20
End: 2025-04-09

## 2025-04-09 NOTE — TELEPHONE ENCOUNTER
Pt missed her appointment today, stating she thought her appointment was for tomorrow.     Please give her a call back to reschedule

## 2025-04-10 ENCOUNTER — CLINICAL SUPPORT (OUTPATIENT)
Dept: OBGYN CLINIC | Facility: MEDICAL CENTER | Age: 20
End: 2025-04-10
Payer: COMMERCIAL

## 2025-04-10 ENCOUNTER — TELEPHONE (OUTPATIENT)
Age: 20
End: 2025-04-10

## 2025-04-10 DIAGNOSIS — Z30.42 ENCOUNTER FOR DEPO-PROVERA CONTRACEPTION: Primary | ICD-10-CM

## 2025-04-10 PROCEDURE — 96372 THER/PROPH/DIAG INJ SC/IM: CPT

## 2025-04-10 RX ADMIN — MEDROXYPROGESTERONE ACETATE 150 MG: 150 INJECTION, SUSPENSION INTRAMUSCULAR at 10:28

## 2025-04-10 NOTE — PROGRESS NOTES
Pt presented for Depo Injection  Given IM RUOQ  Pt tolerated well    PT  SUPPLIED     NDC: 89830-240-84  LOT: [id number]  EXP: 10/31/2028

## 2025-04-10 NOTE — TELEPHONE ENCOUNTER
Pt called and stated she missed her depo appt 4/09. Pt would like to have depo rescheduled. Last depo 01/10. No answer from office.

## 2025-04-23 RX ORDER — MEDROXYPROGESTERONE ACETATE 150 MG/ML
150 INJECTION, SUSPENSION INTRAMUSCULAR ONCE
Status: COMPLETED | OUTPATIENT
Start: 2025-04-23 | End: 2025-04-10

## 2025-04-28 ENCOUNTER — TELEPHONE (OUTPATIENT)
Dept: FAMILY MEDICINE CLINIC | Facility: CLINIC | Age: 20
End: 2025-04-28

## 2025-04-28 NOTE — TELEPHONE ENCOUNTER
PA for WEGOVY 0.5MG/0.5ML SUBMITTED to [facility]    via    []CMM-KEY:   [x]Surescripts-Case ID # [id number]  []Availity-Auth ID # NDC #   []Faxed to plan   []Other website   []Phone call Case ID #     [x]PA sent as URGENT    All office notes, labs and other pertaining documents and studies sent. Clinical questions answered. Awaiting determination from insurance company.     Turnaround time for your insurance to make a decision on your Prior Authorization can take 7-21 business days.

## 2025-04-28 NOTE — TELEPHONE ENCOUNTER
Pt wegovy is still pending prior auth and she is to take the 0.5 mg tomorrow. What should she do?  Please advise

## 2025-04-28 NOTE — TELEPHONE ENCOUNTER
Patient called in inquiring and requesting the advice of Primary Care Provider is it ok to not take wegovy on the day patient always take medication patient states that she is waiting on approval for medication and she usually takes medication on Tuesdays   Please Advise patient is requesting a call back at [phone]

## 2025-04-29 NOTE — TELEPHONE ENCOUNTER
PA for WEGOVY 0.5MG/0.5ML APPROVED     Date(s) approved 04/29/2025-11/29/2025    Case #[id number]     Patient advised by          []MyChart Message  []Phone call   [x]LMOM  []L/M to call office as no active Communication consent on file  []Unable to leave detailed message as VM not approved on Communication consent       Pharmacy advised by    [x]Fax  []Phone call  []Secure Chat     Approval letter scanned into Media Yes

## 2025-05-15 ENCOUNTER — HOSPITAL ENCOUNTER (EMERGENCY)
Facility: HOSPITAL | Age: 20
Discharge: HOME | End: 2025-05-16
Attending: EMERGENCY MEDICINE
Payer: COMMERCIAL

## 2025-05-15 VITALS
OXYGEN SATURATION: 99 % | SYSTOLIC BLOOD PRESSURE: 137 MMHG | DIASTOLIC BLOOD PRESSURE: 87 MMHG | RESPIRATION RATE: 18 BRPM | TEMPERATURE: 98.3 F | HEART RATE: 106 BPM

## 2025-05-15 DIAGNOSIS — R23.8 BLISTERS OF MULTIPLE SITES: Primary | ICD-10-CM

## 2025-05-15 PROCEDURE — 99282 EMERGENCY DEPT VISIT SF MDM: CPT

## 2025-05-16 PROCEDURE — 99284 EMERGENCY DEPT VISIT MOD MDM: CPT

## 2025-05-16 RX ORDER — CEPHALEXIN 500 MG/1
500 CAPSULE ORAL EVERY 6 HOURS SCHEDULED
Qty: 19 CAPSULE | Refills: 0 | Status: SHIPPED | OUTPATIENT
Start: 2025-05-16 | End: 2025-05-21

## 2025-05-16 RX ADMIN — CEPHALEXIN 500 MG: 250 CAPSULE ORAL at 00:24

## 2025-05-16 NOTE — ED PROVIDER NOTES
Time reflects when diagnosis was documented in both MDM as applicable and the Disposition within this note       Time User Action Codes Description Comment    5/16/2025 12:19 AM Mao Guerrero Add [R23.8] Blisters of multiple sites           ED Disposition       ED Disposition   Discharge    Condition   Stable    Date/Time   Fri May 16, 2025 12:23 AM    Comment   Richard Markham discharge to home/self care.                   Assessment & Plan       Medical Decision Making  19 year old female presenting with bilateral blisters on her heels that have worsened for 5 days. Patient states she was at a concert and first noticed the blistered when she got home.  Patient notes it has been more difficulty to ambulate due to the pain. She has taken Motrin with some relief of pain. She has surgery on her right foot in Nov 24, denies pain at her surgical site, states her pain is only in her heels where the blisters are. She has not drained the blisters. She denies fevers, chills, chest pain, SOB, abd pain, NVD, urinary symptoms. On exam, Blisters noted on bilateral heels, blister on right heel larger than left. Erythema at base of blisters, clear fluid noted inside. Surgical scar noted on right foot, no tenderness, erythema, edema, or ecchymosis noted. Neurovascularly intact, normal ROM.     DDX including but not limited to: blisters, cellulitis, other wound    See procedure note. Patient declined pain medication in ED. Will cover patient with keflex. Advised patient to wear open footwear until wounds heal. Advised to clean with warm soap and water daily. Advised to follow up with PCP. Prior to discharge, discharge instructions were discussed with patient at bedside. Patient was provided both verbal and written instructions. Patient is understanding of the discharge instructions and is agreeable to plan of care. Return precautions were discussed with patient bedside, patient verbalized understanding of signs and symptoms that  would necessitate return to the ED. All questions were answered. Patient was comfortable with the plan of care and discharged to home.         Problems Addressed:  Blisters of multiple sites: acute illness or injury    Risk  Prescription drug management.             Medications   cephalexin (KEFLEX) capsule 500 mg (500 mg Oral Given 5/16/25 0024)       ED Risk Strat Scores                    No data recorded                            History of Present Illness       Chief Complaint   Patient presents with    Wound Check     Pt went to  a concert last Saturday and was wearing new shoes. Pt had r foot tendons and  ligaments repaired in November. Since concert, surgical site more erythematous and painful. Pt now limping. Large clear fluid filled blister on heel of R foot.       Past Medical History:   Diagnosis Date    Acne     ADHD     Anemia     Anxiety     Asthma     Concussion     X 2 IN 2017    Depression     Fracture of lumbar spine (HCC)     GERD (gastroesophageal reflux disease)     Hydronephrosis     Pertussis     Stress fracture     Vesicoureteral reflux       Past Surgical History:   Procedure Laterality Date    ANKLE LIGAMENT REPAIR (BROSTRUM) Right 11/27/2024    Procedure: ANKLE LIGAMENT REPAIR ATFL, CFT; REPAIR PERONEAL TENDON, TENDON GRAFT;  Surgeon: Joshua Bledsoe DPM;  Location:  MAIN OR;  Service: Podiatry    CYST REMOVAL      Left forearm    FOOT SURGERY Right     WISDOM TOOTH EXTRACTION        Family History   Problem Relation Age of Onset    Hypertension Mother     Heart murmur Mother     Depression Mother     Anxiety disorder Mother     Cancer Mother     Celiac disease Father     ADD / ADHD Father     Cancer Paternal Grandmother         adrenal gland    Celiac disease Paternal Grandfather     Anxiety disorder Sister     Depression Sister     ADD / ADHD Sister       Social History[1]   E-Cigarette/Vaping    E-Cigarette Use Former User       E-Cigarette/Vaping Substances    Nicotine No     THC No      CBD No     Flavoring No     Other No     Unknown No       I have reviewed and agree with the history as documented.     19 year old female presenting with bilateral blisters on her heels that have worsened for 5 days. Patient states she was at a concert and first noticed the blistered when she got home.  Patient notes it has been more difficulty to ambulate due to the pain. She has taken Motrin with some relief of pain. She has surgery on her right foot in Nov 24, denies pain at her surgical site, states her pain is only in her heels where the blisters are. She has not drained the blisters. She denies fevers, chills, chest pain, SOB, abd pain, NVD, urinary symptoms.       Wound Check         Review of Systems   Constitutional:  Negative for chills and fever.   HENT:  Negative for ear pain and sore throat.    Eyes:  Negative for pain and visual disturbance.   Respiratory:  Negative for cough and shortness of breath.    Cardiovascular:  Negative for chest pain and palpitations.   Gastrointestinal:  Negative for abdominal pain and vomiting.   Genitourinary:  Negative for dysuria and hematuria.   Musculoskeletal:  Negative for arthralgias and back pain.   Skin:  Positive for wound (blisters on bilateral heels.). Negative for color change and rash.   Neurological:  Negative for seizures and syncope.   All other systems reviewed and are negative.          Objective       ED Triage Vitals [05/15/25 2339]   Temperature Pulse Blood Pressure Respirations SpO2 Patient Position - Orthostatic VS   98.3 °F (36.8 °C) (!) 121 137/87 18 100 % --      Temp src Heart Rate Source BP Location FiO2 (%) Pain Score    -- Monitor Right arm -- 9      Vitals      Date and Time Temp Pulse SpO2 Resp BP Pain Score FACES Pain Rating User   05/15/25 2345 -- 106 99 % -- 137/87 -- -- KB   05/15/25 2339 98.3 °F (36.8 °C) 121 100 % 18 137/87 9 --             Physical Exam  Vitals reviewed.   Constitutional:       Appearance: Normal appearance.    HENT:      Head: Normocephalic and atraumatic.      Nose: Nose normal.      Mouth/Throat:      Mouth: Mucous membranes are moist.      Pharynx: Oropharynx is clear.     Eyes:      Conjunctiva/sclera: Conjunctivae normal.       Cardiovascular:      Rate and Rhythm: Normal rate and regular rhythm.      Pulses: Normal pulses.      Heart sounds: Normal heart sounds.   Pulmonary:      Effort: Pulmonary effort is normal.      Breath sounds: Normal breath sounds.   Abdominal:      General: Abdomen is flat. Bowel sounds are normal. There is no distension.      Palpations: Abdomen is soft.      Tenderness: There is no abdominal tenderness. There is no guarding or rebound.     Musculoskeletal:         General: Normal range of motion.      Cervical back: Normal range of motion.     Skin:     General: Skin is warm and dry.      Capillary Refill: Capillary refill takes less than 2 seconds.      Comments: Blisters noted on bilateral heels, blister on right heel larger than left. Erythema at base of blisters, clear fluid noted inside. Surgical scar noted on right foot, no tenderness, erythema, edema, or ecchymosis noted. Neurovascularly intact, normal ROM.      Neurological:      General: No focal deficit present.      Mental Status: She is alert and oriented to person, place, and time. Mental status is at baseline.     Psychiatric:         Mood and Affect: Mood normal.         Behavior: Behavior normal.         Thought Content: Thought content normal.         Judgment: Judgment normal.         Results Reviewed       None            No orders to display       General Procedure    Date/Time: 5/16/2025 12:10 AM    Performed by: Mao Guerrero PA-C  Authorized by: Mao Guerrero PA-C    Patient location:  ED  Consent:     Consent obtained:  Verbal    Consent given by:  Patient    Risks discussed:  Bleeding, infection, pain and incomplete drainage    Alternatives discussed:  No treatment and observation  Indications:     Indications:   Pain  Pre-procedure details:     Skin preparation:  ChloraPrep    Preparation: Patient was prepped and draped in the usual sterile fashion    Anesthesia (see MAR for exact dosages):     Anesthesia method:  None  Procedure Detail:     Procedure note (site, laterality, method, findings):  Blisters cleaned with chlorhexidine. Aspirated fluid from blisters on bilateral heels, 4cc of clear fluid aspirated from blister on right heel, 2cc of clear fluid aspirated from blister on left heel. Wounds covered with sterile gauze. Patient tolerated well, no immediate complications, notes immediate relief of pain.   Post-procedure details:     Patient tolerance of procedure:  Tolerated well, no immediate complications      ED Medication and Procedure Management   Prior to Admission Medications   Prescriptions Last Dose Informant Patient Reported? Taking?   Drysol 20 % external solution   Yes No   Sig: Apply topically   Glycopyrronium Tosylate 2.4 % PADS   Yes No   Sig: Apply topically   Semaglutide-Weight Management (WEGOVY) 0.5 MG/0.5ML   No No   Sig: Inject 0.5 mL (0.5 mg total) under the skin once a week   albuterol (PROVENTIL HFA,VENTOLIN HFA) 90 mcg/act inhaler   No No   Sig: TAKE 2 PUFFS BY MOUTH EVERY 6 HOURS AS NEEDED FOR WHEEZE   benzonatate (TESSALON) 200 MG capsule   No No   Sig: Take 1 capsule (200 mg total) by mouth 3 (three) times a day as needed for cough   cyclobenzaprine (FLEXERIL) 5 mg tablet   No No   Sig: Take 1 tablet (5 mg total) by mouth daily at bedtime   fluticasone (FLONASE) 50 mcg/act nasal spray   No No   Sig: SPRAY 1 SPRAY INTO EACH NOSTRIL EVERY DAY   ibuprofen (MOTRIN) 600 mg tablet   Yes No   Sig: Take 1 tablet by mouth 4 times a day   levalbuterol (Xopenex HFA) 45 mcg/act inhaler   Yes No   Sig: Inhale 1 puff   loratadine (CLARITIN) 10 mg tablet   No No   Sig: TAKE 1 TABLET BY MOUTH EVERY DAY   medroxyPROGESTERone acetate (DEPO-PROVERA SYRINGE) 150 mg/mL injection   No No   Sig: Inject 1 mL (150 mg  total) into a muscle every 3 (three) months   naproxen (NAPROSYN) 375 mg tablet   No No   Sig: Take 1 tablet (375 mg total) by mouth 2 (two) times a day with meals   nystatin (MYCOSTATIN) cream   No No   Sig: Apply 2 g (1 Application total) topically 2 (two) times a day To affected area   ondansetron (ZOFRAN-ODT) 4 mg disintegrating tablet   No No   Sig: Take 1 tablet (4 mg total) by mouth every 6 (six) hours as needed for nausea or vomiting   pantoprazole (PROTONIX) 40 mg tablet   No No   Sig: TAKE 1 TABLET BY MOUTH DAILY BEFORE BREAKFAST   tretinoin (RETIN-A) 0.025 % cream   Yes No      Facility-Administered Medications: None     Discharge Medication List as of 5/16/2025 12:23 AM        START taking these medications    Details   cephalexin (KEFLEX) 500 mg capsule Take 1 capsule (500 mg total) by mouth every 6 (six) hours for 5 days, Starting Fri 5/16/2025, Until Wed 5/21/2025, Normal           CONTINUE these medications which have NOT CHANGED    Details   albuterol (PROVENTIL HFA,VENTOLIN HFA) 90 mcg/act inhaler TAKE 2 PUFFS BY MOUTH EVERY 6 HOURS AS NEEDED FOR WHEEZE, Normal      benzonatate (TESSALON) 200 MG capsule Take 1 capsule (200 mg total) by mouth 3 (three) times a day as needed for cough, Starting Thu 10/24/2024, Normal      cyclobenzaprine (FLEXERIL) 5 mg tablet Take 1 tablet (5 mg total) by mouth daily at bedtime, Starting Thu 5/16/2024, Normal      Drysol 20 % external solution Apply topically, Starting Sun 2/16/2025, Historical Med      fluticasone (FLONASE) 50 mcg/act nasal spray SPRAY 1 SPRAY INTO EACH NOSTRIL EVERY DAY, Normal      Glycopyrronium Tosylate 2.4 % PADS Apply topically, Historical Med      ibuprofen (MOTRIN) 600 mg tablet Take 1 tablet by mouth 4 times a day, Starting Mon 11/25/2024, Historical Med      levalbuterol (Xopenex HFA) 45 mcg/act inhaler Inhale 1 puff, Historical Med      loratadine (CLARITIN) 10 mg tablet TAKE 1 TABLET BY MOUTH EVERY DAY, Normal      medroxyPROGESTERone  acetate (DEPO-PROVERA SYRINGE) 150 mg/mL injection Inject 1 mL (150 mg total) into a muscle every 3 (three) months, Starting Thu 10/10/2024, Normal      naproxen (NAPROSYN) 375 mg tablet Take 1 tablet (375 mg total) by mouth 2 (two) times a day with meals, Starting Sat 3/9/2024, Normal      nystatin (MYCOSTATIN) cream Apply 2 g (1 Application total) topically 2 (two) times a day To affected area, Starting Tue 3/19/2024, Normal      ondansetron (ZOFRAN-ODT) 4 mg disintegrating tablet Take 1 tablet (4 mg total) by mouth every 6 (six) hours as needed for nausea or vomiting, Starting Fri 2/21/2025, Normal      pantoprazole (PROTONIX) 40 mg tablet TAKE 1 TABLET BY MOUTH DAILY BEFORE BREAKFAST, Starting Tue 10/10/2023, Normal      Semaglutide-Weight Management (WEGOVY) 0.5 MG/0.5ML Inject 0.5 mL (0.5 mg total) under the skin once a week, Starting Mon 3/31/2025, Normal      tretinoin (RETIN-A) 0.025 % cream Historical Med           No discharge procedures on file.  ED SEPSIS DOCUMENTATION   Time reflects when diagnosis was documented in both MDM as applicable and the Disposition within this note       Time User Action Codes Description Comment    5/16/2025 12:19 AM Mao Guerrero Add [R23.8] Blisters of multiple sites                    [1]   Social History  Tobacco Use    Smoking status: Never    Smokeless tobacco: Never   Vaping Use    Vaping status: Former   Substance Use Topics    Alcohol use: Yes     Comment: rarely    Drug use: Never        Mao Guerrero PA-C  05/16/25 6338

## 2025-05-16 NOTE — DISCHARGE INSTRUCTIONS
Keflex as prescribed.  Tylenol 650 every 6 hours or Tylenol 1,000 mg every 8 hours as needed for pain.  Ibuprofen(Advil/Motrin) 400-600 mg every 6 hours as needed for pain.   Follow up with PCP as needed.  Wear open shoes until wounds heal.  Return to ER with worsening pain, discharge from wound site, fevers, or any other concerning symptoms.

## 2025-05-21 ENCOUNTER — OFFICE VISIT (OUTPATIENT)
Dept: FAMILY MEDICINE CLINIC | Facility: CLINIC | Age: 20
End: 2025-05-21
Payer: COMMERCIAL

## 2025-05-21 VITALS
HEART RATE: 96 BPM | RESPIRATION RATE: 16 BRPM | HEIGHT: 61 IN | WEIGHT: 138 LBS | BODY MASS INDEX: 26.06 KG/M2 | SYSTOLIC BLOOD PRESSURE: 116 MMHG | TEMPERATURE: 100.2 F | OXYGEN SATURATION: 96 % | DIASTOLIC BLOOD PRESSURE: 68 MMHG

## 2025-05-21 DIAGNOSIS — E66.3 OVERWEIGHT (BMI 25.0-29.9): ICD-10-CM

## 2025-05-21 DIAGNOSIS — L03.115 CELLULITIS OF RIGHT HEEL: Primary | ICD-10-CM

## 2025-05-21 PROCEDURE — 99213 OFFICE O/P EST LOW 20 MIN: CPT | Performed by: FAMILY MEDICINE

## 2025-05-21 NOTE — PROGRESS NOTES
"Name: Richard Markham      : 2005      MRN: [id number]  Encounter Provider: Tomy Montoya MD  Encounter Date: 2025   Encounter department: [facility]  :  Assessment & Plan  Cellulitis of right heel  Improving.  She is to finish her antibiotics.  Continue to use topical ointment.       Overweight (BMI 25.0-29.9)  Improving.  Continue Wegovy 0.5 mg weekly.  Continue to monitor.              History of Present Illness   She is here today for follow-up post ER visit for infected blister on her right foot.  Was started on Keflex and her symptoms have been improving.  She denies any drainage.  Denies any fever or chills.      Review of Systems   Constitutional:  Negative for chills and fever.   HENT:  Negative for trouble swallowing.    Eyes:  Negative for visual disturbance.   Respiratory:  Negative for cough and shortness of breath.    Cardiovascular:  Negative for chest pain, palpitations and leg swelling.   Gastrointestinal:  Negative for abdominal pain, constipation and diarrhea.   Endocrine: Negative for cold intolerance and heat intolerance.   Genitourinary:  Negative for difficulty urinating and dysuria.   Musculoskeletal:  Negative for gait problem.   Skin:  Positive for color change. Negative for rash.   Neurological:  Negative for dizziness, tremors, seizures and headaches.   Hematological:  Negative for adenopathy.   Psychiatric/Behavioral:  Negative for behavioral problems.        Objective   /68 (BP Location: Left arm, Patient Position: Sitting, Cuff Size: Standard)   Pulse 96   Temp 100.2 °F (37.9 °C) (Tympanic)   Resp 16   Ht 5' 1\" (1.549 m)   Wt 62.6 kg (138 lb)   LMP  (LMP Unknown)   SpO2 96%   BMI 26.07 kg/m²      Physical Exam  Vitals and nursing note reviewed.   Constitutional:       Appearance: She is well-developed.   HENT:      Head: Normocephalic and atraumatic.     Eyes:      Pupils: Pupils are equal, round, and reactive to light. "       Cardiovascular:      Rate and Rhythm: Normal rate and regular rhythm.      Heart sounds: Normal heart sounds.   Pulmonary:      Effort: Pulmonary effort is normal.      Breath sounds: Normal breath sounds.   Abdominal:      General: Bowel sounds are normal.      Palpations: Abdomen is soft.     Musculoskeletal:      Cervical back: Normal range of motion and neck supple.        Feet:    Feet:      Comments: Erythema over the posterior aspect of her right heel with no sign of infection.  Lymphadenopathy:      Cervical: No cervical adenopathy.     Skin:     General: Skin is warm.     Neurological:      Mental Status: She is alert and oriented to person, place, and time.

## 2025-05-29 DIAGNOSIS — Z00.6 ENCOUNTER FOR EXAMINATION FOR NORMAL COMPARISON OR CONTROL IN CLINICAL RESEARCH PROGRAM: ICD-10-CM

## 2025-06-27 ENCOUNTER — APPOINTMENT (OUTPATIENT)
Dept: LAB | Facility: IMAGING CENTER | Age: 20
End: 2025-06-27
Payer: COMMERCIAL

## 2025-06-27 DIAGNOSIS — E66.3 OVERWEIGHT (BMI 25.0-29.9): ICD-10-CM

## 2025-06-27 DIAGNOSIS — D50.8 IRON DEFICIENCY ANEMIA SECONDARY TO INADEQUATE DIETARY IRON INTAKE: ICD-10-CM

## 2025-06-27 DIAGNOSIS — Z00.6 ENCOUNTER FOR EXAMINATION FOR NORMAL COMPARISON OR CONTROL IN CLINICAL RESEARCH PROGRAM: ICD-10-CM

## 2025-06-27 LAB
ALBUMIN SERPL BCG-MCNC: 4.8 G/DL (ref 3.5–5)
ALP SERPL-CCNC: 65 U/L (ref 34–104)
ALT SERPL W P-5'-P-CCNC: 16 U/L (ref 7–52)
ANION GAP SERPL CALCULATED.3IONS-SCNC: 10 MMOL/L (ref 4–13)
AST SERPL W P-5'-P-CCNC: 13 U/L (ref 13–39)
BASOPHILS # BLD AUTO: 0.05 THOUSANDS/ÂΜL (ref 0–0.1)
BASOPHILS NFR BLD AUTO: 1 % (ref 0–1)
BILIRUB SERPL-MCNC: 0.5 MG/DL (ref 0.2–1)
BUN SERPL-MCNC: 9 MG/DL (ref 5–25)
CALCIUM SERPL-MCNC: 9.4 MG/DL (ref 8.4–10.2)
CHLORIDE SERPL-SCNC: 106 MMOL/L (ref 96–108)
CO2 SERPL-SCNC: 23 MMOL/L (ref 21–32)
CREAT SERPL-MCNC: 0.75 MG/DL (ref 0.6–1.3)
EOSINOPHIL # BLD AUTO: 0.29 THOUSAND/ÂΜL (ref 0–0.61)
EOSINOPHIL NFR BLD AUTO: 5 % (ref 0–6)
ERYTHROCYTE [DISTWIDTH] IN BLOOD BY AUTOMATED COUNT: 12.6 % (ref 11.6–15.1)
FERRITIN SERPL-MCNC: 102 NG/ML (ref 30–307)
GFR SERPL CREATININE-BSD FRML MDRD: 114 ML/MIN/1.73SQ M
GLUCOSE P FAST SERPL-MCNC: 91 MG/DL (ref 65–99)
HCT VFR BLD AUTO: 41.5 % (ref 34.8–46.1)
HGB BLD-MCNC: 13.9 G/DL (ref 11.5–15.4)
IMM GRANULOCYTES # BLD AUTO: 0.01 THOUSAND/UL (ref 0–0.2)
IMM GRANULOCYTES NFR BLD AUTO: 0 % (ref 0–2)
IRON SATN MFR SERPL: 40 % (ref 15–50)
IRON SERPL-MCNC: 135 UG/DL (ref 50–212)
LYMPHOCYTES # BLD AUTO: 1.75 THOUSANDS/ÂΜL (ref 0.6–4.47)
LYMPHOCYTES NFR BLD AUTO: 31 % (ref 14–44)
MCH RBC QN AUTO: 29.1 PG (ref 26.8–34.3)
MCHC RBC AUTO-ENTMCNC: 33.5 G/DL (ref 31.4–37.4)
MCV RBC AUTO: 87 FL (ref 82–98)
MONOCYTES # BLD AUTO: 0.4 THOUSAND/ÂΜL (ref 0.17–1.22)
MONOCYTES NFR BLD AUTO: 7 % (ref 4–12)
NEUTROPHILS # BLD AUTO: 3.24 THOUSANDS/ÂΜL (ref 1.85–7.62)
NEUTS SEG NFR BLD AUTO: 56 % (ref 43–75)
NRBC BLD AUTO-RTO: 0 /100 WBCS
PLATELET # BLD AUTO: 331 THOUSANDS/UL (ref 149–390)
PMV BLD AUTO: 10.2 FL (ref 8.9–12.7)
POTASSIUM SERPL-SCNC: 4.1 MMOL/L (ref 3.5–5.3)
PROT SERPL-MCNC: 7.3 G/DL (ref 6.4–8.4)
RBC # BLD AUTO: 4.77 MILLION/UL (ref 3.81–5.12)
SODIUM SERPL-SCNC: 139 MMOL/L (ref 135–147)
TIBC SERPL-MCNC: 334.6 UG/DL (ref 250–450)
TRANSFERRIN SERPL-MCNC: 239 MG/DL (ref 203–362)
TSH SERPL DL<=0.05 MIU/L-ACNC: 1.25 UIU/ML (ref 0.45–4.5)
UIBC SERPL-MCNC: 200 UG/DL (ref 155–355)
WBC # BLD AUTO: 5.74 THOUSAND/UL (ref 4.31–10.16)

## 2025-06-27 PROCEDURE — 83540 ASSAY OF IRON: CPT

## 2025-06-27 PROCEDURE — 83550 IRON BINDING TEST: CPT

## 2025-06-27 PROCEDURE — 80053 COMPREHEN METABOLIC PANEL: CPT

## 2025-06-27 PROCEDURE — 36415 COLL VENOUS BLD VENIPUNCTURE: CPT

## 2025-06-27 PROCEDURE — 82728 ASSAY OF FERRITIN: CPT

## 2025-06-27 PROCEDURE — 84443 ASSAY THYROID STIM HORMONE: CPT

## 2025-06-27 PROCEDURE — 85025 COMPLETE CBC W/AUTO DIFF WBC: CPT

## 2025-07-01 ENCOUNTER — OFFICE VISIT (OUTPATIENT)
Dept: FAMILY MEDICINE CLINIC | Facility: CLINIC | Age: 20
End: 2025-07-01
Payer: COMMERCIAL

## 2025-07-01 VITALS
TEMPERATURE: 98.8 F | RESPIRATION RATE: 16 BRPM | HEIGHT: 61 IN | HEART RATE: 116 BPM | BODY MASS INDEX: 24.92 KG/M2 | OXYGEN SATURATION: 97 % | WEIGHT: 132 LBS | DIASTOLIC BLOOD PRESSURE: 78 MMHG | SYSTOLIC BLOOD PRESSURE: 122 MMHG

## 2025-07-01 DIAGNOSIS — Z00.00 HEALTHCARE MAINTENANCE: ICD-10-CM

## 2025-07-01 DIAGNOSIS — F32.1 MAJOR DEPRESSIVE DISORDER, SINGLE EPISODE, MODERATE DEGREE (HCC): Primary | ICD-10-CM

## 2025-07-01 PROCEDURE — 99213 OFFICE O/P EST LOW 20 MIN: CPT | Performed by: FAMILY MEDICINE

## 2025-07-01 PROCEDURE — 99395 PREV VISIT EST AGE 18-39: CPT | Performed by: FAMILY MEDICINE

## 2025-07-05 NOTE — ASSESSMENT & PLAN NOTE
Improving.  Continue on Wegovy 0.5 mg weekly.  Discussed about possible side effects.  Discussed about low-carb diet regular exercise.  We will continue to monitor.  Come back in 6 months.

## 2025-07-05 NOTE — PROGRESS NOTES
"Name: Richard Markham      : 2005      MRN: [id number]  Encounter Provider: Tomy Montoya MD  Encounter Date: 2025   Encounter department: [facility]  :  Assessment & Plan  Major depressive disorder, single episode, moderate degree (HCC)  Well-controlled without medications.       BMI 24.0-24.9, adult  Improving.  Continue on Wegovy 0.5 mg weekly.  Discussed about possible side effects.  Discussed about low-carb diet regular exercise.  We will continue to monitor.  Come back in 6 months.       Healthcare maintenance  It was discussed about immunizations, diet, exercise and safety measures.              History of Present Illness   She is here today for follow-up for weight management.  She has been using her Wegovy 0.5 mg weekly.  She lost 6 pounds since last visit.  She denies any side effects.  Her depression has been well-controlled without medications.  She denies any other complaint.      Review of Systems   Constitutional:  Negative for chills and fever.   HENT:  Negative for trouble swallowing.    Eyes:  Negative for visual disturbance.   Respiratory:  Negative for cough and shortness of breath.    Cardiovascular:  Negative for chest pain, palpitations and leg swelling.   Gastrointestinal:  Negative for abdominal pain, constipation and diarrhea.   Endocrine: Negative for cold intolerance and heat intolerance.   Genitourinary:  Negative for difficulty urinating and dysuria.   Musculoskeletal:  Negative for gait problem.   Skin:  Negative for rash.   Neurological:  Negative for dizziness, tremors, seizures and headaches.   Hematological:  Negative for adenopathy.   Psychiatric/Behavioral:  Negative for behavioral problems.        Objective   /78 (BP Location: Left arm, Patient Position: Sitting, Cuff Size: Adult)   Pulse (!) 116   Temp 98.8 °F (37.1 °C) (Tympanic)   Resp 16   Ht 5' 1\" (1.549 m)   Wt 59.9 kg (132 lb)   SpO2 97%   BMI 24.94 kg/m²      Physical " Exam  Vitals and nursing note reviewed.   Constitutional:       Appearance: She is well-developed.   HENT:      Head: Normocephalic and atraumatic.     Eyes:      Pupils: Pupils are equal, round, and reactive to light.       Cardiovascular:      Rate and Rhythm: Normal rate and regular rhythm.      Heart sounds: Normal heart sounds.   Pulmonary:      Effort: Pulmonary effort is normal.      Breath sounds: Normal breath sounds.   Abdominal:      General: Bowel sounds are normal.      Palpations: Abdomen is soft.     Musculoskeletal:      Cervical back: Normal range of motion and neck supple.   Lymphadenopathy:      Cervical: No cervical adenopathy.     Skin:     General: Skin is warm.     Neurological:      Mental Status: She is alert and oriented to person, place, and time.

## 2025-07-08 ENCOUNTER — CLINICAL SUPPORT (OUTPATIENT)
Dept: OBGYN CLINIC | Facility: MEDICAL CENTER | Age: 20
End: 2025-07-08
Payer: COMMERCIAL

## 2025-07-08 DIAGNOSIS — Z30.42 ENCOUNTER FOR DEPO-PROVERA CONTRACEPTION: Primary | ICD-10-CM

## 2025-07-08 PROCEDURE — 96372 THER/PROPH/DIAG INJ SC/IM: CPT

## 2025-07-08 RX ORDER — MEDROXYPROGESTERONE ACETATE 150 MG/ML
150 INJECTION, SUSPENSION INTRAMUSCULAR ONCE
Status: COMPLETED | OUTPATIENT
Start: 2025-07-08 | End: 2025-07-08

## 2025-07-08 RX ADMIN — MEDROXYPROGESTERONE ACETATE 150 MG: 150 INJECTION, SUSPENSION INTRAMUSCULAR at 13:22

## 2025-07-08 NOTE — PROGRESS NOTES
Patient presents for Depo-Provera injection.  Patient supplied medication.  Patient tolerated IM injection IM LOUQ.  Denied questions or concerns at conclusion of conversation.  Aware repeat injection is due in 3 months.  Encouraged to schedule prior to leaving office at today's visit.  UPT in office today not needed   Lot # [id number] NDC [id number] Expiration date 6/30/29

## 2025-07-11 LAB
APOB+LDLR+PCSK9 GENE MUT ANL BLD/T: NOT DETECTED
BRCA1+BRCA2 DEL+DUP + FULL MUT ANL BLD/T: NOT DETECTED
MLH1+MSH2+MSH6+PMS2 GN DEL+DUP+FUL M: NOT DETECTED

## 2025-07-19 DIAGNOSIS — E66.3 OVERWEIGHT (BMI 25.0-29.9): ICD-10-CM

## 2025-07-21 RX ORDER — SEMAGLUTIDE 0.5 MG/.5ML
INJECTION, SOLUTION SUBCUTANEOUS
Qty: 2 ML | Refills: 0 | Status: SHIPPED | OUTPATIENT
Start: 2025-07-21

## 2025-08-04 PROBLEM — Z00.00 HEALTHCARE MAINTENANCE: Status: RESOLVED | Noted: 2022-07-20 | Resolved: 2025-08-04

## 2025-08-15 ENCOUNTER — TELEPHONE (OUTPATIENT)
Age: 20
End: 2025-08-15

## 2025-08-19 DIAGNOSIS — E66.3 OVERWEIGHT (BMI 25.0-29.9): ICD-10-CM

## 2025-08-19 RX ORDER — SEMAGLUTIDE 0.5 MG/.5ML
0.5 INJECTION, SOLUTION SUBCUTANEOUS WEEKLY
Qty: 2 ML | Refills: 0 | Status: SHIPPED | OUTPATIENT
Start: 2025-08-19 | End: 2025-09-10

## (undated) DEVICE — POV-IOD SOLUTION 4OZ BT

## (undated) DEVICE — SUT PDS II 2-0 CT-1 27 IN Z339H

## (undated) DEVICE — DRILL: Brand: SONICFUSION

## (undated) DEVICE — KERLIX BANDAGE ROLL: Brand: KERLIX

## (undated) DEVICE — CHLORAPREP HI-LITE 26ML ORANGE

## (undated) DEVICE — WEBRIL 6 IN UNSTERILE

## (undated) DEVICE — GLOVE SRG LF STRL BGL SKNSNS 7 PF

## (undated) DEVICE — SINGLE PORT MANIFOLD: Brand: NEPTUNE 2

## (undated) DEVICE — PAD CAST 4 IN COTTON NON STERILE

## (undated) DEVICE — SUT VICRYL 2-0 SH 27 IN UNDYED J417H

## (undated) DEVICE — STOCKINETTE 2P PREROLLD 6X60

## (undated) DEVICE — ACE WRAP 4 IN UNSTERILE

## (undated) DEVICE — DISPOSABLE OR TOWEL: Brand: CARDINAL HEALTH

## (undated) DEVICE — CUFF TOURNIQUET 30 X 4 IN QUICK CONNECT DISP 1BLA

## (undated) DEVICE — INTENDED FOR TISSUE SEPARATION, AND OTHER PROCEDURES THAT REQUIRE A SHARP SURGICAL BLADE TO PUNCTURE OR CUT.: Brand: BARD-PARKER ® SAFETYLOCK CARBON RIB-BACK BLADES

## (undated) DEVICE — SCD SEQUENTIAL COMPRESSION COMFORT SLEEVE MEDIUM KNEE LENGTH: Brand: KENDALL SCD

## (undated) DEVICE — CURITY NON-ADHERENT STRIPS: Brand: CURITY

## (undated) DEVICE — GAUZE SPONGES,16 PLY: Brand: CURITY

## (undated) DEVICE — SPLINT 4 IN X 15 FT DYNACAST S

## (undated) DEVICE — NEPTUNE E-SEP SMOKE EVACUATION PENCIL, COATED, 70MM BLADE, PUSH BUTTON SWITCH: Brand: NEPTUNE E-SEP

## (undated) DEVICE — C-ARM: Brand: UNBRANDED

## (undated) DEVICE — CAST CART BUCKET

## (undated) DEVICE — BETHLEHEM UNIVERSAL  MIONR EXT: Brand: CARDINAL HEALTH

## (undated) DEVICE — STERILE POLYISOPRENE POWDER-FREE SURGICAL GLOVES WITH EMOLLIENT COATING: Brand: PROTEXIS